# Patient Record
Sex: MALE | Race: BLACK OR AFRICAN AMERICAN | Employment: OTHER | URBAN - METROPOLITAN AREA
[De-identification: names, ages, dates, MRNs, and addresses within clinical notes are randomized per-mention and may not be internally consistent; named-entity substitution may affect disease eponyms.]

---

## 2022-11-17 ENCOUNTER — TRANSCRIBE ORDER (OUTPATIENT)
Dept: SCHEDULING | Age: 77
End: 2022-11-17

## 2022-11-17 ENCOUNTER — OFFICE VISIT (OUTPATIENT)
Dept: NEUROLOGY | Age: 77
End: 2022-11-17
Payer: MEDICARE

## 2022-11-17 DIAGNOSIS — D47.2 MONOCLONAL GAMMOPATHY: Primary | ICD-10-CM

## 2022-11-17 DIAGNOSIS — R20.2 PARESTHESIA: Primary | ICD-10-CM

## 2022-11-17 PROCEDURE — 95911 NRV CNDJ TEST 9-10 STUDIES: CPT | Performed by: PSYCHIATRY & NEUROLOGY

## 2022-11-17 PROCEDURE — 1101F PT FALLS ASSESS-DOCD LE1/YR: CPT | Performed by: PSYCHIATRY & NEUROLOGY

## 2022-11-17 PROCEDURE — G8419 CALC BMI OUT NRM PARAM NOF/U: HCPCS | Performed by: PSYCHIATRY & NEUROLOGY

## 2022-11-17 PROCEDURE — 99213 OFFICE O/P EST LOW 20 MIN: CPT | Performed by: PSYCHIATRY & NEUROLOGY

## 2022-11-17 PROCEDURE — G8536 NO DOC ELDER MAL SCRN: HCPCS | Performed by: PSYCHIATRY & NEUROLOGY

## 2022-11-17 PROCEDURE — 1123F ACP DISCUSS/DSCN MKR DOCD: CPT | Performed by: PSYCHIATRY & NEUROLOGY

## 2022-11-17 PROCEDURE — 95886 MUSC TEST DONE W/N TEST COMP: CPT | Performed by: PSYCHIATRY & NEUROLOGY

## 2022-11-17 PROCEDURE — G8427 DOCREV CUR MEDS BY ELIG CLIN: HCPCS | Performed by: PSYCHIATRY & NEUROLOGY

## 2022-11-17 PROCEDURE — G8432 DEP SCR NOT DOC, RNG: HCPCS | Performed by: PSYCHIATRY & NEUROLOGY

## 2022-11-17 NOTE — PROCEDURES
EMG/ NCS Report  [facility]  - [facility]  [facility]  [facility], [address] [place], [address]   Ph: [phone]   FAX: [phone]/ [phone]  Test Date:  2019      Test Date:  2022    Patient: Sissy Zamarripa : 1945 Physician: Negin Naidu MD   Sex: Male Height: ' \" Ref Phys: Brianna Luis MD   ID#: [id number] Weight:  lbs. Technician: Dafne Westbrook     Patient History / Exam:  Patient is coming for neuropathy evaluation. 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 (+) weakness of  (+) R wrist pain. (+) R medial elbow tenderness    Exam: Patient awake, alert, follows commands, clear speech; hearing grossly intact; EOMI, (-) facial asymmetry, tongue midline; Motor: normal bulk and tone, no tremor              Strength: 5/5 all four extremities  (+) R Tinel  (-) Phalen's  Sensory: reduced PP tips of fingers of R hand  Reflexes: 2+ UE and knees 1+ ankles throughout; Down going toes  Coordination: Good FTN and HTS  Gait: normal gait including tandem             EMG & NCV Findings:  Evaluation of the left median motor nerve showed prolonged distal onset latency (7.7 ms), normal amplitude (5.0 mV), and decreased conduction velocity (Elbow-Wrist, 47 m/s). The right median motor nerve showed prolonged distal onset latency (7.5 ms), reduced amplitude (3.7 mV), and normal conduction velocity (Elbow-Wrist, 63 m/s). The left ulnar motor nerve showed normal distal onset latency (3.1 ms), normal amplitude (8.6 mV), normal conduction velocity (B Elbow-Wrist, 50 m/s), and normal conduction velocity (A Elbow-B Elbow, 50 m/s).   The right ulnar motor nerve showed normal distal onset latency (3.0 ms), normal amplitude (12.6 mV), normal conduction velocity (B Elbow-Wrist, 54 m/s), and decreased conduction velocity (A Elbow-B Elbow, 45 m/s). The left median sensory and the right median sensory nerves showed no response (Wrist). The left radial sensory, the right radial sensory, the left ulnar sensory, and the right ulnar sensory nerves showed normal distal peak latency (L2.5, R2.2, L4.0, R3.5 ms) and normal amplitude (L30.8, R43.1, L15.5, R9.8 µV). Left vs. Right side comparison data for the median motor nerve indicates abnormal L-R velocity difference (Elbow-Wrist, 16 m/s). All remaining left vs. right side differences were within normal limits. All F Wave latencies were within normal limits. All F Wave left vs. right side latency differences were within normal limits. Needle evaluation of the left abductor pollicis brevis and the right abductor pollicis brevis muscles showed diminished recruitment, Incr Duration, and increased motor unit amplitude. All remaining muscles (as indicated in the following table) showed no evidence of electrical instability. Impression:  ABNORMAL    Extensive electrodiagnostic examination of the right and left upper extremities shows the followin) Evidence of bilateral median neuropathies at or distal to the wrist, consistent with a clinical diagnosis of carpal tunnel syndrome, both severe in degree electrically, right worse than left. 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 There is no evidence of a cervical motor radiculopathy.           Adilene Ghosh MD  Diplomate, American Board of Psychiatry and Neurology  Diplomate, Neuromuscular Medicine  Diplomate, American Board of Electrodiagnostic Medicine  Director, [address] [place] [address] Accredited Laboratory with Exemplary Status            Nerve Conduction Studies  Anti Sensory Summary Table     Stim Site NR Peak (ms) Norm Peak (ms) P-T Amp (µV) Norm P-T Amp Site1 Site2 Dist (cm)   Left Median Anti Sensory (2nd Digit)  32.1 °C   Wrist NR  <4  >13 Wrist 2nd Digit 14.0   Right Median Anti Sensory (2nd Digit)  32.5 °C   Wrist NR  <4  >13 Wrist 2nd Digit 14.0   Left Radial Anti Sensory (Base 1st Digit)  32.8 °C   Wrist    2.5 <2.8 30.8 >11 Wrist Base 1st Digit 10.0   Right Radial Anti Sensory (Base 1st Digit)  32.8 °C   Wrist    2.2 <2.8 43.1 >11 Wrist Base 1st Digit 10.0   Left Ulnar Anti Sensory (5th Digit)  32.5 °C   Wrist    4.0 <4.0 15.5 >9 Wrist 5th Digit 14.0   Right Ulnar Anti Sensory (5th Digit)  32.7 °C   Wrist    3.5 <4.0 9.8 >9 Wrist 5th Digit 14.0     Motor Summary Table     Stim Site NR Onset (ms) Norm Onset (ms) O-P Amp (mV) Norm O-P Amp Amp (Prev) (%) Site1 Site2 Dist (cm) Tl (m/s) Norm Tl (m/s)   Left Median Motor (Abd Poll Brev)  32.8 °C   Wrist    7.7 <4.5 5.0 >4.1 100.0 Wrist Abd Poll Brev 8.0     Elbow    13.0  4.4  88.0 Elbow Wrist 25.0 47 >49   Right Median Motor (Abd Poll Brev)  32.9 °C   Wrist    7.5 <4.5 3.7 >4.1 100.0 Wrist Abd Poll Brev 8.0     Elbow    11.3  3.3  89.2 Elbow Wrist 24.0 63 >49   Left Ulnar Motor (Abd Dig Minimi)  32.8 °C   Wrist    3.1 <3.1 8.6 >7.0 100.0 Wrist Abd Dig Minimi 8.0     B Elbow    8.1  7.2  83.7 B Elbow Wrist 25.0 50 >50   A Elbow    10.1  8.2  113.9 A Elbow B Elbow 10.0 50 >50   Right Ulnar Motor (Abd Dig Minimi)  33 °C   Wrist    3.0 <3.1 12.6 >7.0 100.0 Wrist Abd Dig Minimi 8.0     B Elbow    7.6  11.4  90.5 B Elbow Wrist 25.0 54 >50   A Elbow    9.8  10.8  94.7 A Elbow B Elbow 10.0 45 >50     F Wave Studies     NR F-Lat (ms) Lat Norm (ms) L-R F-Lat (ms) L-R Lat Norm   Left Ulnar (Mrkrs) (Abd Dig Min)  32.7 °C      31.70 <32 0.23 <2.5   Right Ulnar (Mrkrs) (Abd Dig Min)  33 °C      31.93 <32 0.23 <2.5     EMG     Side Muscle Nerve Root Ins Act Fibs Psw Recrt Duration Amp Poly Comment   Left 1stDorInt Ulnar C8-T1 Nml Nml Nml Nml Nml Nml Nml    Left ExtIndicis Radial (Post Int) C7-8 Nml Nml Nml Nml Nml Nml Nml    Left Abd Poll Brev Median C8-T1 Nml Nml Nml Reduced Incr Incr Nml    Left Biceps Musculocut C5-6 Nml Nml Nml Nml Nml Nml Nml    Left Triceps Radial C6-7-8 Nml Nml Nml Nml Nml Nml Nml    Left Deltoid Axillary C5-6 Nml Nml Nml Nml Nml Nml Nml    Right 1stDorInt Ulnar C8-T1 Nml Nml Nml Nml Nml Nml Nml    Right ExtIndicis Radial (Post Int) C7-8 Nml Nml Nml Nml Nml Nml Nml    Right Abd Poll Brev Median C8-T1 Nml Nml Nml Reduced Incr Incr Nml    Right Biceps Musculocut C5-6 Nml Nml Nml Nml Nml Nml Nml    Right Triceps Radial C6-7-8 Nml Nml Nml Nml Nml Nml Nml    Right Deltoid Axillary C5-6 Nml Nml Nml Nml Nml Nml Nml    Right Lower Cerv Parasp Rami C7,T1 Nml Nml Nml Nml Nml Nml Nml                Nerve Conduction Studies  Anti Sensory Left/Right Comparison     Stim Site L Lat (ms) R Lat (ms) L-R Lat (ms) L Amp (µV) R Amp (µV) L-R Amp (%) Site1 Site2 L Tl (m/s) R Tl (m/s) L-R Tl (m/s)   Median Anti Sensory (2nd Digit)  32.1 °C   Wrist       Wrist 2nd Digit      Radial Anti Sensory (Base 1st Digit)  32.8 °C   Wrist 1.8 1.6 0.2 30.8 43.1 28.5 Wrist Base 1st Digit 56 63 7   Ulnar Anti Sensory (5th Digit)  32.5 °C   Wrist 3.2 3.0 0.2 15.5 9.8 36.8 Wrist 5th Digit 44 47 3     Motor Left/Right Comparison     Stim Site L Lat (ms) R Lat (ms) L-R Lat (ms) L Amp (mV) R Amp (mV) L-R Amp (%) Site1 Site2 L Tl (m/s) R Tl (m/s) L-R Tl (m/s)   Median Motor (Abd Poll Brev)  32.8 °C   Wrist 7.7 7.5 0.2 5.0 3.7 26.0 Wrist Abd Poll Brev      Elbow 13.0 11.3 1.7 4.4 3.3 25.0 Elbow Wrist 47 63 16   Ulnar Motor (Abd Dig Minimi)  32.8 °C   Wrist 3.1 3.0 0.1 8.6 12.6 31.7 Wrist Abd Dig Minimi      B Elbow 8.1 7.6 0.5 7.2 11.4 36.8 B Elbow Wrist 50 54 4   A Elbow 10.1 9.8 0.3 8.2 10.8 24.1 A Elbow B Elbow 50 45 5         Waveforms:

## 2022-11-17 NOTE — PROGRESS NOTES
EMG/NCS done. See Procedure Note for results. Patient already tried wrist brace with no benefit      Discussed EMG/NCS of both UE:  1) Evidence of bilateral median neuropathies at or distal to the wrist, consistent with a clinical diagnosis of carpal tunnel syndrome, both severe in degree electrically, right worse than left. 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       A> Bilateral CTS, both severe in degree, R worse than L  Superimposed R ulnar neuropathy at the elbow segment, mild in degree    P> 1) SInce patient already failed wrist brace, will refer to Dr Sundra Hammans for bilateral CTS release  2) Advise to keep R elbow straight at night and avoid leaning on the R elbow    Follow up as needed    Mahamed Grant MD

## 2022-11-23 ENCOUNTER — HOSPITAL ENCOUNTER (OUTPATIENT)
Dept: GENERAL RADIOLOGY | Age: 77
Discharge: HOME | End: 2022-11-23
Attending: INTERNAL MEDICINE
Payer: MEDICARE

## 2022-11-23 DIAGNOSIS — D47.2 MONOCLONAL GAMMOPATHY: ICD-10-CM

## 2022-11-23 PROCEDURE — 77075 RADEX OSSEOUS SURVEY COMPL: CPT

## 2022-11-30 ENCOUNTER — OFFICE VISIT (OUTPATIENT)
Dept: ORTHOPEDIC SURGERY | Age: 77
End: 2022-11-30
Payer: MEDICARE

## 2022-11-30 VITALS — HEIGHT: 74 IN | BODY MASS INDEX: 24.77 KG/M2 | WEIGHT: 193 LBS

## 2022-11-30 DIAGNOSIS — G56.03 BILATERAL CARPAL TUNNEL SYNDROME: Primary | ICD-10-CM

## 2022-11-30 DIAGNOSIS — M79.641 BILATERAL HAND PAIN: ICD-10-CM

## 2022-11-30 DIAGNOSIS — G56.21 ULNAR NEUROPATHY AT ELBOW OF RIGHT UPPER EXTREMITY: ICD-10-CM

## 2022-11-30 DIAGNOSIS — M79.642 BILATERAL HAND PAIN: ICD-10-CM

## 2022-11-30 DIAGNOSIS — M65.341 TRIGGER RING FINGER OF RIGHT HAND: ICD-10-CM

## 2022-11-30 NOTE — PROGRESS NOTES
HPI: Ana Rosa Jeffers. (: 1945) is a 76 y.o. male, patient, here for evaluation of the following chief complaint(s):    Hand Pain (Right greater than left hand numbness, tingling for years and gradually worsening. Right hand dominant retired physician.)  Patient is seen today to evaluate his hands. 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 He denies any fall or injury. He states he did build a gazebo around 2017 and noted numbness especially in his hands after awakening from sleep. He has tried to treat conservatively. 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 Lastly he has been complaining of catching triggering locking of his right ring finger. Vitals:  Ht 6' 2\" (1.88 m)   Wt 193 lb (87.5 kg)   BMI 24.78 kg/m²    Body mass index is 24.78 kg/m². No Known Allergies    Current Outpatient Medications   Medication Sig    metoprolol succinate (TOPROL-XL) 25 mg XL tablet     dutasteride (AVODART) 0.5 mg capsule     simvastatin (ZOCOR) 20 mg tablet     tamsulosin (FLOMAX) 0.4 mg capsule     METFORMIN HCL (METFORMIN PO) Take  by mouth two (2) times a day. glipiZIDE (GLUCOTROL) 10 mg tablet Take 10 mg by mouth two (2) times a day. multivitamin (ONE A DAY) tablet Take 1 Tab by mouth daily. SITagliptin (JANUVIA) 50 mg tablet Take 50 mg by mouth daily. No current facility-administered medications for this visit. History reviewed. No pertinent past medical history. History reviewed. No pertinent surgical history. History reviewed. No pertinent family history. Review of Systems   All other systems reviewed and are negative.            Physical Exam    In general the patient demonstrates good elbow, forearm, wrist and digital range of motion. He has Tinel signs positive in both the cubital and carpal tunnels producing paresthesias in the ulnar and median nerve distributions respectively. He has tenderness at the A1 pulley of the right ring finger with clicking and locking. He really had no complaints of significance of the left side though clinically it appears that he has a mildly positive Tinel's of the left carpal tunnel and seems to have intact thenar strength bilaterally    Imaging:    XR Results (most recent):  Results from Hospital Encounter encounter on 11/23/22    XR BONE SURVEY COMP    Narrative  Clinical indication: Monoclonal gammopathy. A complete bone survey is performed. No prior. Bone appears normal in mineral content. Prominent spondylosis seen throughout the spine. No Fracture or dislocation. No focal lytic or blastic lesions. The pedicles are visualized. Impression  1. No acute fracture or dislocation. 2. Diffuse mild DJD. 3. No focal lytic or blastic lesions. ASSESSMENT/PLAN:  Below is the assessment and plan developed based on review of pertinent history, physical exam, labs, studies, and medications. Patient examination was consistent with bilateral right greater than left wrist carpal tunnel syndrome with right elbow ulnar nerve entrapment and right ring finger stenosing tenosynovitis with locking. He has been increasingly symptomatic for 7 to 8 years. I reviewed treatment options and answered his questions. He feels he has exhausted his conservative options and elects to pursue operative treatment. The current plan is to perform a right open carpal tunnel release right elbow ulnar nerve decompression of the right ring trigger finger. In the future he could then consider at least an left carpal tunnel release.   I reviewed risks that include but not limited to stiffness, pain, nerve or tendon damage and overall incomplete relief of pain and paresthesias. Arrangements may be made for this to be performed in outpatient basis at his convenience although he is hoping to recover well enough to travel to [place] in February. 1. Bilateral carpal tunnel syndrome  2. Bilateral hand pain  3. Ulnar neuropathy at elbow of right upper extremity  4. Trigger ring finger of right hand      Return for Follow-up 7 to 10 days after surgery. .    An electronic signature was used to authenticate this note.   -- Conrad Mosqueda MD

## 2022-11-30 NOTE — LETTER
11/30/2022    Patient: Olga Sosa. YOB: 1945   Date of Visit: 11/30/2022     Brenda Mcneal MD  [address] [address]  [address] Naomie Rangel MD  [address]  [address] [facility] [address]    Dear MD Madeline Kim MD,      Thank you for referring Mr. Tiesha Prado to [facility] for evaluation. My notes for this consultation are attached. If you have questions, please do not hesitate to call me. I look forward to following your patient along with you.       Sincerely,    Kat Anthony MD

## 2022-11-30 NOTE — PATIENT INSTRUCTIONS
Carpal Tunnel Syndrome: Care Instructions  Overview     Carpal tunnel syndrome is numbness, tingling, weakness, and pain in your hand, wrist, and sometimes forearm. It is caused by pressure on the median nerve. This nerve and several tough tissues called tendons run through a space in the wrist. This space is called the carpal tunnel. The repeated hand motions used in work and some hobbies and sports can put pressure on the median nerve. Pregnancy can cause carpal tunnel syndrome. Several conditions, such as diabetes, arthritis, and an underactive thyroid, can also cause it. You may be able to limit an activity or change the way you do it to reduce your symptoms. You also can take other steps to feel better. If your symptoms are mild, 1 to 2 weeks of home treatment are likely to ease your pain. Surgery is needed only if other treatments do not work. Follow-up care is a key part of your treatment and safety. Be sure to make and go to all appointments, and call your doctor if you are having problems. It's also a good idea to know your test results and keep a list of the medicines you take. How can you care for yourself at home? If possible, stop or reduce the activity that causes your symptoms. If you cannot stop the activity, take frequent breaks to rest and stretch or change hand positions to do a task. Try switching hands, such as when using a computer mouse. Try to avoid bending or twisting your wrists. Ask your doctor if you can take an over-the-counter pain medicine, such as acetaminophen (Tylenol), ibuprofen (Advil, Motrin), or naproxen (Aleve). Be safe with medicines. Read and follow all instructions on the label. If your doctor prescribes corticosteroid medicine to help reduce pain and swelling, take it exactly as prescribed. Call your doctor if you think you are having a problem with your medicine. Put ice or a cold pack on your wrist for 10 to 20 minutes at a time to ease pain.  Put a thin cloth between the ice and your skin. If your doctor or your physical or occupational therapist tells you to wear a wrist splint, wear it as directed to keep your wrist in a neutral position. This also eases pressure on your median nerve. Ask your doctor whether you should have physical or occupational therapy to learn how to do tasks differently. Try a yoga class to stretch your muscles and build strength in your hands and wrists. Yoga has been shown to ease carpal tunnel symptoms. To prevent carpal tunnel  When working at a computer, keep your hands and wrists in line with your forearms. Hold your elbows close to your sides. Take a break every 10 to 15 minutes. Try these exercises:  Warm up: Rotate your wrist up, down, and from side to side. Repeat this 4 times. Stretch your fingers far apart, relax them, then stretch them again. Repeat 4 times. Stretch your thumb by pulling it back gently, holding it, and then releasing it. Repeat 4 times. Prayer stretch: Start with your palms together in front of your chest just below your chin. Slowly lower your hands toward your waistline while keeping your hands close to your stomach and your palms together until you feel a mild to moderate stretch under your forearms. Hold for 10 to 20 seconds. Repeat 4 times. Wrist flexor stretch: Hold your arm in front of you with your palm up. Bend your wrist, pointing your hand toward the floor. With your other hand, gently bend your wrist further until you feel a mild to moderate stretch in your forearm. Hold for 10 to 20 seconds. Repeat 4 times. Wrist extensor stretch: Repeat the steps for the wrist flexor stretch, but begin with your extended hand palm down. Squeeze a rubber exercise ball several times a day to keep your hands and fingers strong. Avoid holding objects (such as a book) in one position for a long time. When possible, use your whole hand to grasp an object.  Using just the thumb and index finger can put stress on the wrist.  Do not smoke. It can make this condition worse by reducing blood flow to the median nerve. If you need help quitting, talk to your doctor about stop-smoking programs and medicines. These can increase your chances of quitting for good. When should you call for help? Watch closely for changes in your health, and be sure to contact your doctor if:    Your pain or other problems do not get better with home care. You want more information about physical or occupational therapy. You have side effects of your corticosteroid medicine, such as:  Weight gain. Mood changes. Trouble sleeping. Bruising easily. You have any other problems with your medicine. Where can you learn more? Go to http://www.gray.com/  Enter R432 in the search box to learn more about \"Carpal Tunnel Syndrome: Care Instructions. \"  Current as of: March 9, 2022               Content Version: 13.4  © 8094-0657 [employer]. Care instructions adapted under license by [employer] (which disclaims liability or warranty for this information). If you have questions about a medical condition or this instruction, always ask your healthcare professional. [place] [address] any warranty or liability for your use of this information. Trigger Finger: Care Instructions  Overview  A trigger finger is a finger stuck in a bent position. It happens when the tendon that bends and straightens the thumb or finger can't slide smoothly under the ligaments that hold the tendon against the bones. In most cases, it's caused by a bump (nodule) that forms on the tendon. The bent finger usually straightens out on its own. A trigger finger can be painful. But it normally isn't a serious problem. Trigger fingers seem to occur more in some groups of people. These groups include:  People who have diabetes or arthritis.   People who have injured their hands in the past.  Musicians. People who  tools often. Rest and exercises may help your finger relax so that it can bend. You may get a corticosteroid shot. This can reduce swelling and pain. Your doctor may put a splint on your finger. It will give your finger some rest. You may need surgery if the finger keeps locking in a bent position. Follow-up care is a key part of your treatment and safety. Be sure to make and go to all appointments, and call your doctor if you are having problems. It's also a good idea to know your test results and keep a list of the medicines you take. How can you care for yourself at home? If your doctor put a splint on your finger, wear it as directed. Don't take it off until your doctor says you can. You may need to change your activities to avoid movements that irritate the finger. Take your medicines exactly as prescribed. Call your doctor if you think you are having a problem with your medicine. Ask your doctor if you can take an over-the-counter pain medicine, such as acetaminophen (Tylenol), ibuprofen (Advil, Motrin), or naproxen (Aleve). Be safe with medicines. Read and follow all instructions on the label. If your doctor recommends exercises, do them as directed. When should you call for help? Call your doctor now or seek immediate medical care if:    Your finger locks in a bent position and will not straighten. Watch closely for changes in your health, and be sure to contact your doctor if:    You do not get better as expected. Where can you learn more? Go to http://www.Acomni.com/  Enter M826 in the search box to learn more about \"Trigger Finger: Care Instructions. \"  Current as of: March 9, 2022               Content Version: 13.4  © 5081-3633 [employer]. Care instructions adapted under license by [employer] (which disclaims liability or warranty for this information).  If you have questions about a medical condition or this instruction, always ask your healthcare professional. [place] [address] any warranty or liability for your use of this information.

## 2022-12-01 DIAGNOSIS — G56.03 BILATERAL CARPAL TUNNEL SYNDROME: ICD-10-CM

## 2022-12-01 DIAGNOSIS — G56.21 ULNAR NEUROPATHY AT ELBOW OF RIGHT UPPER EXTREMITY: ICD-10-CM

## 2022-12-01 DIAGNOSIS — M65.341 TRIGGER RING FINGER OF RIGHT HAND: Primary | ICD-10-CM

## 2022-12-08 NOTE — PERIOP NOTES
[facility]  Ambulatory Surgery Unit  Pre-operative Instructions    Surgery/Procedure Date  Wednesday 12/14/2022            Tentative Arrival Time TBD      1. On the day of your surgery/procedure, please report to the Ambulatory Surgery Unit Registration Desk and sign in at your designated time. The Ambulatory Surgery Unit is located in [facility] on the [facility] side of the [facility] across from the [address] [place] [address]. Please have all of your health insurance cards, co-payment, and a photo ID.    **TWO adults may accompany you the day of the procedure. We have limited seating available. If our waiting room is at capacity, your ride may be asked to remain in their vehicle. No one under 15 is allowed in the waiting room. 2. You must have someone with you to drive you home, as you should not drive a car for 24 hours following anesthesia. Please make arrangements for a responsible adult friend or family member to stay with you for at least the first 24 hours after your surgery. 3. Do not have anything to eat or drink (including water, gum, mints, coffee, juice) after 11:59 PM on Tuesday 12/13 . This may not apply to medications prescribed by your physician. (Please note below the special instructions with medications to take the morning of surgery, if applicable.)    4. We recommend you do not drink any alcoholic beverages for 24 hours before and after your surgery. 5. Contact your surgeons office for instructions on the following medications: non-steroidal anti-inflammatory drugs (i.e. Advil, Aleve), vitamins, and supplements. (Some surgeons will want you to stop these medications prior to surgery and others may allow you to take them)   **If you are currently taking Plavix, Coumadin, Aspirin and/or other blood-thinning agents, contact your surgeon for instructions. ** Your surgeon will partner with the physician prescribing these medications to determine if it is safe to stop or if you need to continue taking. Please do not stop taking these medications without instructions from your surgeon. 6. In an effort to help prevent surgical site infection, we ask that you shower with an anti-bacterial soap (i.e. Dial/Safeguard, or the soap provided to you at your preadmission testing appointment) for 3 days prior to and on the morning of surgery, using a fresh towel after each shower. (Please begin this process with fresh bed linens.) Do not apply any lotions, powders, or deodorants after the shower on the day of your procedure. If applicable, please do not shave the operative site for 48 hours prior to surgery. 7. Wear comfortable clothes. Wear glasses instead of contacts. Do not bring any jewelry or money (other than copays or fees as instructed). Do not wear make-up, particularly mascara, the morning of your surgery. Do not wear nail polish, particularly if you are having foot /hand surgery. Wear your hair loose or down, no ponytails, buns, luciana pins or clips. All body piercings must be removed. 8. You should understand that if you do not follow these instructions your surgery may be cancelled. If your physical condition changes (i.e. fever, cold or flu) please contact your surgeon as soon as possible. 9. It is important that you be on time. If a situation occurs where you may be late, or if you have any questions or problems, please call [phone].    10. Your surgery time may be subject to change. You will receive a phone call the day prior to surgery to confirm your arrival time. 11. Pediatric patients: please bring a change of clothes, diapers, bottle/sippy cup, pacifier, etc.      Special Instructions: Take all medications and inhalers, as prescribed, on the morning of surgery with a sip of water EXCEPT: diabetic medications      Insulin Dependent Diabetic patients: Take your diabetic medications as prescribed the day before surgery.   Hold all diabetic medications the day of surgery. If you are scheduled to arrive for surgery after 8:00 AM, and your AM blood sugar is >200, please call Ambulatory Surgery. I understand a pre-operative phone call will be made to verify my surgery time. In the event that I am not available, I give permission for a message to be left on my answering service and/or with another person?       Yes    Reviewed instructions via telephone, patient verbalized understanding         ___________________      ___________________      ________________  (Signature of Patient)          (Witness)                   (Date and Time)

## 2022-12-13 ENCOUNTER — ANESTHESIA EVENT (OUTPATIENT)
Dept: SURGERY | Age: 77
End: 2022-12-13
Payer: MEDICARE

## 2022-12-13 DIAGNOSIS — G56.21 ULNAR NEUROPATHY AT ELBOW OF RIGHT UPPER EXTREMITY: ICD-10-CM

## 2022-12-13 DIAGNOSIS — M65.341 TRIGGER RING FINGER OF RIGHT HAND: ICD-10-CM

## 2022-12-13 DIAGNOSIS — G56.03 BILATERAL CARPAL TUNNEL SYNDROME: Primary | ICD-10-CM

## 2022-12-13 RX ORDER — HYDROCODONE BITARTRATE AND ACETAMINOPHEN 5; 325 MG/1; MG/1
1 TABLET ORAL
Qty: 15 TABLET | Refills: 0 | Status: SHIPPED | OUTPATIENT
Start: 2022-12-13 | End: 2022-12-18

## 2022-12-14 ENCOUNTER — ANESTHESIA (OUTPATIENT)
Dept: SURGERY | Age: 77
End: 2022-12-14
Payer: MEDICARE

## 2022-12-14 ENCOUNTER — HOSPITAL ENCOUNTER (OUTPATIENT)
Age: 77
Setting detail: OUTPATIENT SURGERY
Discharge: HOME | End: 2022-12-14
Attending: ORTHOPAEDIC SURGERY | Admitting: ORTHOPAEDIC SURGERY
Payer: MEDICARE

## 2022-12-14 VITALS
DIASTOLIC BLOOD PRESSURE: 71 MMHG | HEART RATE: 48 BPM | WEIGHT: 199 LBS | RESPIRATION RATE: 14 BRPM | SYSTOLIC BLOOD PRESSURE: 142 MMHG | BODY MASS INDEX: 25.54 KG/M2 | TEMPERATURE: 97.6 F | HEIGHT: 74 IN | OXYGEN SATURATION: 99 %

## 2022-12-14 DIAGNOSIS — G56.01 RIGHT CARPAL TUNNEL SYNDROME: Primary | ICD-10-CM

## 2022-12-14 DIAGNOSIS — M65.341 TRIGGER FINGER, RIGHT RING FINGER: ICD-10-CM

## 2022-12-14 DIAGNOSIS — G56.21 ENTRAPMENT OF RIGHT ULNAR NERVE AT ELBOW: ICD-10-CM

## 2022-12-14 DIAGNOSIS — M65.331 TRIGGER FINGER, RIGHT MIDDLE FINGER: ICD-10-CM

## 2022-12-14 LAB
GLUCOSE BLD STRIP.AUTO-MCNC: 161 MG/DL (ref 65–117)
SERVICE CMNT-IMP: ABNORMAL

## 2022-12-14 PROCEDURE — 77030031139 HC SUT VCRL2 J&J -A: Performed by: ORTHOPAEDIC SURGERY

## 2022-12-14 PROCEDURE — 76030000000 HC AMB SURG OR TIME 0.5 TO 1: Performed by: ORTHOPAEDIC SURGERY

## 2022-12-14 PROCEDURE — 76060000061 HC AMB SURG ANES 0.5 TO 1 HR: Performed by: ORTHOPAEDIC SURGERY

## 2022-12-14 PROCEDURE — 64718 REVISE ULNAR NERVE AT ELBOW: CPT | Performed by: ORTHOPAEDIC SURGERY

## 2022-12-14 PROCEDURE — 77030002916 HC SUT ETHLN J&J -A: Performed by: ORTHOPAEDIC SURGERY

## 2022-12-14 PROCEDURE — 74011250636 HC RX REV CODE- 250/636: Performed by: ANESTHESIOLOGY

## 2022-12-14 PROCEDURE — 77030040356 HC CORD BPLR FRCP COVD -A: Performed by: ORTHOPAEDIC SURGERY

## 2022-12-14 PROCEDURE — 2709999900 HC NON-CHARGEABLE SUPPLY: Performed by: ORTHOPAEDIC SURGERY

## 2022-12-14 PROCEDURE — 77030039497 HC CST PAD STERILE CHCS -A: Performed by: ORTHOPAEDIC SURGERY

## 2022-12-14 PROCEDURE — 82962 GLUCOSE BLOOD TEST: CPT

## 2022-12-14 PROCEDURE — 74011000250 HC RX REV CODE- 250: Performed by: ORTHOPAEDIC SURGERY

## 2022-12-14 PROCEDURE — 26055 INCISE FINGER TENDON SHEATH: CPT | Performed by: ORTHOPAEDIC SURGERY

## 2022-12-14 PROCEDURE — 74011250636 HC RX REV CODE- 250/636: Performed by: NURSE ANESTHETIST, CERTIFIED REGISTERED

## 2022-12-14 PROCEDURE — 76210000040 HC AMBSU PH I REC FIRST 0.5 HR: Performed by: ORTHOPAEDIC SURGERY

## 2022-12-14 PROCEDURE — 76210000050 HC AMBSU PH II REC 0.5 TO 1 HR: Performed by: ORTHOPAEDIC SURGERY

## 2022-12-14 PROCEDURE — 74011250636 HC RX REV CODE- 250/636: Performed by: ORTHOPAEDIC SURGERY

## 2022-12-14 PROCEDURE — 77030000032 HC CUF TRNQT ZIMM -B: Performed by: ORTHOPAEDIC SURGERY

## 2022-12-14 PROCEDURE — 77030002933 HC SUT MCRYL J&J -A: Performed by: ORTHOPAEDIC SURGERY

## 2022-12-14 PROCEDURE — 64721 CARPAL TUNNEL SURGERY: CPT | Performed by: ORTHOPAEDIC SURGERY

## 2022-12-14 PROCEDURE — 77030006689 HC BLD OPHTH BVR BD -A: Performed by: ORTHOPAEDIC SURGERY

## 2022-12-14 RX ORDER — SODIUM CHLORIDE 0.9 % (FLUSH) 0.9 %
5-40 SYRINGE (ML) INJECTION EVERY 8 HOURS
Status: DISCONTINUED | OUTPATIENT
Start: 2022-12-14 | End: 2022-12-14 | Stop reason: HOSPADM

## 2022-12-14 RX ORDER — FENTANYL CITRATE 50 UG/ML
25 INJECTION, SOLUTION INTRAMUSCULAR; INTRAVENOUS
Status: DISCONTINUED | OUTPATIENT
Start: 2022-12-14 | End: 2022-12-14 | Stop reason: HOSPADM

## 2022-12-14 RX ORDER — HYDROMORPHONE HYDROCHLORIDE 1 MG/ML
.2-.5 INJECTION, SOLUTION INTRAMUSCULAR; INTRAVENOUS; SUBCUTANEOUS ONCE
Status: DISCONTINUED | OUTPATIENT
Start: 2022-12-14 | End: 2022-12-14 | Stop reason: HOSPADM

## 2022-12-14 RX ORDER — MIDAZOLAM HYDROCHLORIDE 1 MG/ML
INJECTION, SOLUTION INTRAMUSCULAR; INTRAVENOUS
Status: COMPLETED
Start: 2022-12-14 | End: 2022-12-14

## 2022-12-14 RX ORDER — SODIUM CHLORIDE, SODIUM LACTATE, POTASSIUM CHLORIDE, CALCIUM CHLORIDE 600; 310; 30; 20 MG/100ML; MG/100ML; MG/100ML; MG/100ML
25 INJECTION, SOLUTION INTRAVENOUS CONTINUOUS
Status: DISCONTINUED | OUTPATIENT
Start: 2022-12-14 | End: 2022-12-14 | Stop reason: HOSPADM

## 2022-12-14 RX ORDER — ONDANSETRON 2 MG/ML
4 INJECTION INTRAMUSCULAR; INTRAVENOUS AS NEEDED
Status: DISCONTINUED | OUTPATIENT
Start: 2022-12-14 | End: 2022-12-14 | Stop reason: HOSPADM

## 2022-12-14 RX ORDER — DIPHENHYDRAMINE HYDROCHLORIDE 50 MG/ML
12.5 INJECTION, SOLUTION INTRAMUSCULAR; INTRAVENOUS AS NEEDED
Status: DISCONTINUED | OUTPATIENT
Start: 2022-12-14 | End: 2022-12-14 | Stop reason: HOSPADM

## 2022-12-14 RX ORDER — LIDOCAINE HYDROCHLORIDE 10 MG/ML
0.1 INJECTION, SOLUTION EPIDURAL; INFILTRATION; INTRACAUDAL; PERINEURAL AS NEEDED
Status: DISCONTINUED | OUTPATIENT
Start: 2022-12-14 | End: 2022-12-14 | Stop reason: HOSPADM

## 2022-12-14 RX ORDER — MORPHINE SULFATE 10 MG/ML
2 INJECTION, SOLUTION INTRAMUSCULAR; INTRAVENOUS
Status: DISCONTINUED | OUTPATIENT
Start: 2022-12-14 | End: 2022-12-14 | Stop reason: HOSPADM

## 2022-12-14 RX ORDER — PROPOFOL 10 MG/ML
INJECTION, EMULSION INTRAVENOUS
Status: DISCONTINUED | OUTPATIENT
Start: 2022-12-14 | End: 2022-12-14 | Stop reason: HOSPADM

## 2022-12-14 RX ORDER — OXYCODONE AND ACETAMINOPHEN 5; 325 MG/1; MG/1
1 TABLET ORAL
Status: DISCONTINUED | OUTPATIENT
Start: 2022-12-14 | End: 2022-12-14 | Stop reason: HOSPADM

## 2022-12-14 RX ORDER — SODIUM CHLORIDE 0.9 % (FLUSH) 0.9 %
5-40 SYRINGE (ML) INJECTION AS NEEDED
Status: DISCONTINUED | OUTPATIENT
Start: 2022-12-14 | End: 2022-12-14 | Stop reason: HOSPADM

## 2022-12-14 RX ORDER — MIDAZOLAM HYDROCHLORIDE 1 MG/ML
INJECTION, SOLUTION INTRAMUSCULAR; INTRAVENOUS
Status: COMPLETED | OUTPATIENT
Start: 2022-12-14 | End: 2022-12-14

## 2022-12-14 RX ORDER — ROPIVACAINE HYDROCHLORIDE 5 MG/ML
INJECTION, SOLUTION EPIDURAL; INFILTRATION; PERINEURAL
Status: DISCONTINUED
Start: 2022-12-14 | End: 2022-12-14 | Stop reason: WASHOUT

## 2022-12-14 RX ADMIN — WATER 2 G: 1 INJECTION INTRAMUSCULAR; INTRAVENOUS; SUBCUTANEOUS at 12:09

## 2022-12-14 RX ADMIN — PROPOFOL 50 MCG/KG/MIN: 10 INJECTION, EMULSION INTRAVENOUS at 12:04

## 2022-12-14 RX ADMIN — SODIUM CHLORIDE, POTASSIUM CHLORIDE, SODIUM LACTATE AND CALCIUM CHLORIDE: 600; 310; 30; 20 INJECTION, SOLUTION INTRAVENOUS at 11:59

## 2022-12-14 RX ADMIN — MIDAZOLAM HYDROCHLORIDE 1 MG: 1 INJECTION, SOLUTION INTRAMUSCULAR; INTRAVENOUS at 11:40

## 2022-12-14 RX ADMIN — MEPIVACAINE HYDROCHLORIDE 30 ML: 15 INJECTION, SOLUTION EPIDURAL; INFILTRATION at 11:43

## 2022-12-14 NOTE — DISCHARGE INSTRUCTIONS
Dr. Teofilo Nichols Instructions for Elbow/Wrist/Hand Surgery    Medications/Diet  Eat only light, non-greasy foods today  Take pain medicine with food  While taking pain medicines, you may not operate a vehicle, heavy machinery, or appliances  While taking pain medicines, you may not drink alcoholic beverages  While taking pain medicines, you may not make critical decisions or sign legal papers  If you have any reactions to your medicines, stop taking them and call my office immediately  Please keep in mind that constipation is a very common side   effect of taking narcotic pain medication. We recommend that patients take precautions to prevent constipation:  Drink plenty of water (6-8 glasses of 8 oz. a day)  Avoid alcohol, caffeine, and dairy products  Eat plenty of fiber (fruits, vegetables and whole grains)  Take an over the counter stool softener (colace or dulcolax)  Patients that have had upper extremity surgery should take frequent walks      Activity/Exercise    Exercises are not necessary at this stage, and you will be given exercises at your first post operative visit  You are in a splint and should remain in this until your first postoperative visit  Please keep ice applied to the wrist for the first 72 hours or as long as pain or swelling persist. Do not apply ice directly to skin, or allow water to leak on your dressing    Dressings/Shower    Please keep dressing dry  If you have had arthroscopy, you may expect some drainage  Please reinforce your dressing with a dry sterile dressing  Your dressing will be removed at your first post operative visit  You may not shower until after your first post operative visit    Emergency/Follow-Up    Please notify my office at [phone] if you develop any fever (101° or above), unexpected warmth, redness or swelling in your hand.  Please call if your fingers become cold, purple, numb, or there is excessive bleeding  Please call the office within 24 hours at [phone] ([phone]) to schedule a follow up appointment next week  Please call the office before 3pm on Friday if you do not have enough pain medicine for the weekend. Narcotic pain medication cannot be called into your pharmacy and the prescription must be picked up at our office. TO PREVENT AN INFECTION      [address] YOUR HANDS    To prevent infection, good handwashing is the most important thing you or your caregiver can do. Wash your hands with soap and water or use the hand  we gave you before you touch any wounds. SHOWER    Use the antibacterial soap we gave you when you take a shower. Shower with this soap until your wounds are healed. To reach all areas of your body, you may need someone to help you. Dont forget to clean your belly button with every shower. USE CLEAN SHEETS    Use freshly cleaned sheets on your bed after surgery. To keep the surgery site clean, do not allow pets to sleep with you while your wound is still healing. STOP SMOKING    Stop smoking, or at least cut back on smoking    Smoking slows your healing. CONTROL YOUR BLOOD SUGAR    High blood sugars slow wound healing. If you are diabetic, control your blood sugar levels before and after your surgery. MAY TAKE 500MG OF TYLENOL WHEN TAKING THE NARCOTIC. TAKE NARCOTIC PAIN MEDICATIONS WITH FOOD! For the night of surgery, while block is still in effect, start with 1 pain pill at bedtime    Narcotics tend to be constipating and we recommend taking a stool softener such as Colace or Miralax (follow package instructions). If you were given prescriptions, please review the written information on the prescribed medications. DO NOT DRIVE WHILE TAKING NARCOTIC PAIN MEDICATIONS. CPAP PATIENTS BE SURE TO WEAR MACHINE WHENEVER NAPPING OR SLEEPING DAY/NIGHT OF SURGERY!     DISCHARGE SUMMARY from Nurse    The following personal items collected during your admission are returned to you:   Dental Appliance: Dental Appliances: None  Vision: Visual Aid: Glasses, With patient  Hearing Aid:    Jewelry: Jewelry: None  Clothing: Clothing: With patient  Other Valuables: Other Valuables: Eyeglasses, With patient  Valuables sent to safe:        PATIENT INSTRUCTIONS:    After General Anesthesia or Intravenous Sedation, for 24 hours or while taking prescription Narcotics:  Someone should be with you for the next 24 hours. For your own safety, a responsible adult must drive you home. Limit your activities  Recommended activity: Rest today, up with assistance today. Do not climb stairs or shower unattended for the next 24 hours. Start with a soft bland diet and advance as tolerated (no nausea) to regular diet. If you have a sore throat some things that may help are: fluids, warm salt water gargle, or throat lozenges. If this does not improve after several days please follow up with your family physician. Do not drive and operate hazardous machinery  Do not make important personal or business decisions  Do  not drink alcoholic beverages  If you have not urinated within 8 hours after discharge, please contact your surgeon on call. Report the following to your surgeon:  Excessive pain, swelling, redness or odor of or around the surgical area  Temperature over 100.5  Nausea and vomiting lasting longer than 4 hours or if unable to take medications  Any signs of decreased circulation or nerve impairment to extremity: change in color, persistent  numbness, tingling, coldness or increase pain    If you received an upper extremity nerve block, please wear your sling until the block has worn off, then refer to your surgeons post-operative instructions. If you have had a shoulder block or a block near your collar bone, you may have              symptoms such as:          1. Mild shortness of breath        2. A hoarse voice        3. Blurry vision        4. Unequal pupils        5.  Drooping of your face on the same side as the nerve block. These symptoms will disappear as the nerve block wears off. You will receive a Post Operative Call from one of the Recovery Room Nurses on the day after your surgery to check on you. It is very important for us to know how you are recovering after your surgery. If you have an issue please call your surgeon, do not wait for the post operative call. You may receive an e-mail or letter in the mail from [employer] regarding your experience with us in the Ambulatory Surgery Unit. Your feedback is valuable to us and we appreciate your participation in the survey. If the above instructions are not adequate or you are having problems after your surgery, call your physician at their office number. We wish youre a speedy recovery ? What to do at Home:    *  Please give a list of your current medications to your Primary Care Provider. *  Please update this list whenever your medications are discontinued, doses are      changed, or new medications (including over-the-counter products) are added. *  Please carry medication information at all times in case of emergency situations. If you have not had your influenza or pneumococcal vaccines, please follow up with your primary care physician. The discharge information has been reviewed with the patient and caregiver. The patient and caregiver verbalized understanding.

## 2022-12-14 NOTE — BRIEF OP NOTE
Brief Postoperative Note    Patient: Ana Rosa Jeffers. YOB: 1945  MRN: [id number]    Date of Procedure: 12/14/2022     Pre-Op Diagnosis: CARPAL TUNNEL SYNROME, ULNAR NEUROPATHY TRIGGER FINGER    Post-Op Diagnosis: Same as preoperative diagnosis. Procedure(s):  RIGHT OPEN CARPAL TUNNEL RELEASE, RIGHT ELBOW ULNAR NERVE DECOMPRESSION, RIGHT RING TRIGGER FINGER RELEASE, & RIGHT MIDDLE FINGER TRIGGER FINGER RELEASE (AXILLARY, BLOCK)  .     Surgeon(s):  Taras Lew MD    Surgical Assistant: None    Anesthesia: MAC     Estimated Blood Loss (mL): Minimal    Complications: None    Specimens: * No specimens in log *     Implants: * No implants in log *    Drains: * No LDAs found *    Findings: Above    Electronically Signed by Ana Polo MD on 12/14/2022 at 12:51 PM

## 2022-12-14 NOTE — ANESTHESIA POSTPROCEDURE EVALUATION
Procedure(s):  RIGHT OPEN CARPAL TUNNEL RELEASE, RIGHT ELBOW ULNAR NERVE DECOMPRESSION, RIGHT RING TRIGGER FINGER RELEASE, & RIGHT MIDDLE FINGER TRIGGER FINGER RELEASE (AXILLARY, BLOCK)  . Dora Bloodgood MAC, regional    Anesthesia Post Evaluation      Multimodal analgesia: multimodal analgesia used between 6 hours prior to anesthesia start to PACU discharge  Patient location during evaluation: PACU  Patient participation: complete - patient participated  Level of consciousness: awake and alert  Pain score: 0  Airway patency: patent  Anesthetic complications: no  Cardiovascular status: acceptable and bradycardic  Respiratory status: acceptable  Hydration status: acceptable  Comments: Pt has Supraclavicular block; sling postop until block resolves. Post anesthesia nausea and vomiting:  none  Final Post Anesthesia Temperature Assessment:  Normothermia (36.0-37.5 degrees C)      INITIAL Post-op Vital signs:   Vitals Value Taken Time   /53 12/14/22 1306   Temp 36.3 °C (97.4 °F) 12/14/22 1254   Pulse 50 12/14/22 1311   Resp 12 12/14/22 1311   SpO2 100 % 12/14/22 1311   Vitals shown include unvalidated device data.

## 2022-12-14 NOTE — PERIOP NOTES
Dr. Edmund Claire performed a RUE block with ultrasound. Patient on continuous cardiac and pulse oximetry monitoring throughout the procedure, nasal cannula 3 liters. Patient received 1 mg, Versed, IV, administered by Dr. Edmund Claire. Vital signs stable. Patient tolerated procedure well. Patient drowsy but arouses when spoken to. Will continue to monitor.

## 2022-12-14 NOTE — PERIOP NOTES
Patient: Yarelis Bhatti. MRN: [id number]  SSN: [id number]   YOB: 1945  Age: 76 y.o. Sex: male     Patient is status post Procedure(s):  RIGHT OPEN CARPAL TUNNEL RELEASE, RIGHT ELBOW ULNAR NERVE DECOMPRESSION, RIGHT RING TRIGGER FINGER RELEASE, & RIGHT MIDDLE FINGER TRIGGER FINGER RELEASE (AXILLARY, BLOCK)  . Andres Lala Surgeon(s) and Role:     Tanja Gallagher MD - Primary      Dressing/Packing:  Incision 12/14/22 Arm Right-Dressing/Treatment: Cast padding;Gauze dressing/dressing sponge;Steri-strips;Xeroform; Other (Comment);ABD pad; Ace wrap (MASTISOL) (12/14/22 1100)

## 2022-12-14 NOTE — PERIOP NOTES
Permission received to review discharge instructions and discuss private health information with wifeScotty. Patient states that family/friend will be with them for at least 24 hours following today's procedure. Mistral-Air warming blanket applied at this time. Set to appropriate setting that is comfortable to patient. Will continue to monitor.

## 2022-12-14 NOTE — PERIOP NOTES
Dr. Lance Bateman at bedside speaking to pt. Pt is A&O x3. Pt and Dr. Lance Bateman both agree to add to consent, right middle finger, trigger finger release. Spoke to Mrs. Birminghama Stallings in waiting room about the addition to the surgery consent. She voiced understanding and signed consent since pt had received Versed.

## 2022-12-14 NOTE — ANESTHESIA PREPROCEDURE EVALUATION
Relevant Problems   No relevant active problems       Anesthetic History   No history of anesthetic complications            Review of Systems / Medical History  Patient summary reviewed, nursing notes reviewed and pertinent labs reviewed    Pulmonary  Within defined limits                 Neuro/Psych   Within defined limits           Cardiovascular                  Exercise tolerance: >4 METS  Comments: Irregular hear beat    05/15 ECHO= EF 65%, mild LVH    2015 Holter: SR, PVC, PACs, 4 runs SVT    Normal Cath   GI/Hepatic/Renal         Renal disease (Stage III): CRI       Endo/Other    Diabetes: type 2         Other Findings   Comments: Right Carpal tunnel Syndrome  Ulnar neuropathy   Trigger finger           Physical Exam    Airway  Mallampati: I  TM Distance: > 6 cm  Neck ROM: normal range of motion   Mouth opening: Normal     Cardiovascular    Rhythm: regular  Rate: normal         Dental    Dentition: Bridges  Comments: Upper left   Pulmonary  Breath sounds clear to auscultation               Abdominal  GI exam deferred       Other Findings            Anesthetic Plan    ASA: 2  Anesthesia type: MAC and regional - supraclavicular block      Post-op pain plan if not by surgeon: peripheral nerve block single    Induction: Intravenous  Anesthetic plan and risks discussed with: Patient      Took BB at 7 am

## 2022-12-14 NOTE — H&P
Date of Surgery Update:  Antoni Chicas was seen and examined. History and physical has been reviewed. The patient has been examined. There have been no significant clinical changes since the completion of the originally dated History and Physical.  Patient identified by surgeon; surgical site was confirmed by patient and surgeon.     Signed By: Johanna Acosta MD     December 14, 2022 11:57 AM

## 2022-12-14 NOTE — PERIOP NOTES
Catalino Situ.  1945  [id number]    Situation:  Verbal report given from: Ajay Broderick CRNA  Procedure: Procedure(s):  RIGHT OPEN CARPAL TUNNEL RELEASE, RIGHT ELBOW ULNAR NERVE DECOMPRESSION, RIGHT RING TRIGGER FINGER RELEASE, & RIGHT MIDDLE FINGER TRIGGER FINGER RELEASE (AXILLARY, BLOCK)  .     Background:    Preoperative diagnosis: CARPAL TUNNEL SYNROME, ULNAR NEUROPATHY TRIGGER FINGER    Postoperative diagnosis: CARPAL TUNNEL SYNROME, ULNAR NEUROPATHY TRIGGER FINGER    :  Dr. Annabelle Shirley    Assistant(s): Circ-1: Zuly Pate RN  Scrub Tech-1: Maggy Russo    Specimens: * No specimens in log *    Assessment:  Intra-procedure medications         Anesthesia gave intra-procedure sedation and medications, see anesthesia flow sheet     Intravenous fluids: LR@ KVO     Vital signs stable       Recommendation:    Permission to share finding with family : yes

## 2022-12-14 NOTE — ANESTHESIA PROCEDURE NOTES
Peripheral Block    Start time: 12/14/2022 11:37 AM  End time: 12/14/2022 11:46 AM  Performed by: Montserrat Pike MD  Authorized by: Montserrat Pike MD       Pre-procedure:    Indications: at surgeon's request, post-op pain management and primary anesthetic    Preanesthetic Checklist: patient identified, risks and benefits discussed, site marked, timeout performed, anesthesia consent given, patient being monitored and fire risk safety assessment completed and verbalized    Timeout Time: 11:39 EST      Block Type:   Block Type:  Supraclavicular  Laterality:  Right  Monitoring:  Standard ASA monitoring, responsive to questions and oxygen  Injection Technique:  Single shot  Procedures: ultrasound guided    Patient Position: supine  Prep: chlorhexidine    Location:  Supraclavicular  Needle Type:  Stimuplex  Needle Gauge:  22 G  Needle Localization:  Ultrasound guidance  Medication Injected:  Midazolam (VERSED) injection - IntraVENous   1 mg - 12/14/2022 11:40:00 AM  mepivacaine PF (CARBOCAINE) 1.5 % injection - Peripheral Nerve Block, Right Arm   30 mL - 12/14/2022 11:43:00 AM  Med Admin Time: 12/14/2022 11:43 AM    Assessment:  Number of attempts:  1  Injection Assessment:  Incremental injection every 5 mL, negative aspiration for CSF, no paresthesia, ultrasound image on chart, local visualized surrounding nerve on ultrasound, negative aspiration for blood and no intravascular symptoms  Patient tolerance:  Patient tolerated the procedure well with no immediate complications

## 2022-12-14 NOTE — PERIOP NOTES
1254  Pt arrived to PACU. Pt not arrousable. VSS. Dresing on right arm CDI. Arm elevated. 1309 Pt waking up. Denies pain. Not moving right fingers or moving arm. 1330 Discharge instructions reviewed with wife. 1350 Pt alert and oriented. VSS. Sling placed. Pt denies pain. Pt meets discharge criteria.

## 2022-12-15 NOTE — OP NOTES
[address]  OPERATIVE REPORT    Name:  Alva Pitt  MR#:  [id number]  :  1945  ACCOUNT #:  [de-identified]  DATE OF SERVICE:  2022    PREOPERATIVE DIAGNOSES:  1. Right wrist carpal tunnel syndrome. 2.  Right ring and middle finger stenosing tenosynovitis. 3.  Right elbow ulnar nerve entrapment syndrome. POSTOPERATIVE DIAGNOSES:  1. Right wrist carpal tunnel syndrome. 2.  Right ring and middle finger stenosing tenosynovitis. 3.  Right elbow ulnar nerve entrapment syndrome. PROCEDURES PERFORMED:  1. Right wrist open carpal tunnel release. 2.  Right middle and ring trigger finger A1 pulley release. 3.  Right elbow in situ ulnar nerve decompression. SURGEON:  Dorie Carpenter MD    ASSISTANT:  There were no surgical assistants. ANESTHESIA:  Regional nerve block and sedation. COMPLICATIONS:  There were no complications. SPECIMENS REMOVED:  There were no specimens. IMPLANTS:  There were no implants. ESTIMATED BLOOD LOSS:  Less than 5 mL. DRAINS:  There were no drains placed. 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 He elects to be taken to the operating this time for surgical treatment. REPORT OF OPERATION:  The patient was identified, taken into the operating room, placed supine on the operating room table. He received a regional nerve block and sedation in the preoperative area. He was placed supine on the operating room table. His right upper extremity was prepped and draped sterilely. After Esmarch exsanguination the tourniquet was inflated to 250 mm. A 2-3 cm longitudinal incision was made in the proximal right palm directed towards the third volar webspace. Skin flaps were elevated. The palmar fascia fibers were divided revealing the transverse carpal ligament.   A mosquito hemostat was passed beneath the distal edge from distal to proximal transverse carpal ligament was released with a 64 Arthur blade over top mosquito. Proximally the carpal tunnel guide was inserted. The [facility] was passed in the skid of the guide. This completed the release of the transverse carpal ligament including a couple of centimeters of distal antebrachial fascia. The median nerve was inspected. It was minimally flattened but not an hourglass shape. There were no masses in the carpal canal.  The wound was irrigated with saline and then closed with 3-0 Vicryl sutures subcutaneously and 4-0 nylon horizontal mattress sutures on the skin. Next, two separate 1-1.5 cm longitudinal incision was made over the A1 pulley region of the ring and middle fingers. Skin flaps were elevated as the flexor tendon sheaths were exposed. The first annular pulleys were markedly thickened. The central third of each A1 pulley was incised with a 64 Arthur blade. Distal dissections were to the A2 pulley and proximally the palmar aponeurotic pulley and sheaths were released with tenotomy scissors. The edges of the A1 pulley were treated with bipolar electrocautery trying to prevent recurrence. The tendons glided smoothly without clicking or locking. The wounds were irrigated and closed with 3-0 Vicryl sutures subcutaneously and 4-0 nylon horizontal mattress sutures on the skin. Next, a 3-4 cm curvilinear incision was made beneath the mid epicondyle of the right elbow. Skin flaps were elevated. A branch of the medial antebrachial cutaneous nerve was identified, protected and preserved. The ulnar nerve was identified. It was dissected proximally releasing towards the arcade of [place], distally Arevalo's ligament and true cubital tunnel retinaculum was next released followed by the fascial head origin of the flexor carpi ulnaris for several centimeters distal to the medial epicondyle.   The ulnar nerve stayed within its groove upon release and had no tendency towards subluxation. The wound was thoroughly irrigated with saline and closed with 3-0 Vicryl sutures subcutaneously and 4-0 Monocryl buried subcuticular suture. Mastisol and Steri-Strips were applied followed by soft dressings from the hand to the mid arm. The tourniquet was deflated. The hand was pink and had good refill. He was transported into the recovery room postoperatively in good condition.       Herminia Parker MD      [id number]  D:  12/14/2022 12:56  T:  12/14/2022 22:22  JOB #:  [id number]

## 2022-12-20 ENCOUNTER — HOSPITAL ENCOUNTER (EMERGENCY)
Age: 77
Discharge: HOME | End: 2022-12-20
Attending: EMERGENCY MEDICINE
Payer: MEDICARE

## 2022-12-20 ENCOUNTER — APPOINTMENT (OUTPATIENT)
Dept: GENERAL RADIOLOGY | Age: 77
End: 2022-12-20
Attending: EMERGENCY MEDICINE
Payer: MEDICARE

## 2022-12-20 VITALS
RESPIRATION RATE: 18 BRPM | SYSTOLIC BLOOD PRESSURE: 107 MMHG | HEART RATE: 56 BPM | TEMPERATURE: 98.2 F | HEIGHT: 74 IN | WEIGHT: 195 LBS | BODY MASS INDEX: 25.03 KG/M2 | OXYGEN SATURATION: 99 % | DIASTOLIC BLOOD PRESSURE: 66 MMHG

## 2022-12-20 DIAGNOSIS — R79.89 ELEVATED TSH: ICD-10-CM

## 2022-12-20 DIAGNOSIS — I48.0 PAROXYSMAL ATRIAL FIBRILLATION (HCC): Primary | ICD-10-CM

## 2022-12-20 LAB
ALBUMIN SERPL-MCNC: 3.7 G/DL (ref 3.5–5)
ALBUMIN/GLOB SERPL: 1 {RATIO} (ref 1.1–2.2)
ALP SERPL-CCNC: 108 U/L (ref 45–117)
ALT SERPL-CCNC: 28 U/L (ref 12–78)
ANION GAP SERPL CALC-SCNC: 4 MMOL/L (ref 5–15)
AST SERPL-CCNC: 19 U/L (ref 15–37)
ATRIAL RATE: 59 BPM
ATRIAL RATE: 67 BPM
BASOPHILS # BLD: 0 K/UL (ref 0–0.1)
BASOPHILS NFR BLD: 1 % (ref 0–1)
BILIRUB SERPL-MCNC: 0.3 MG/DL (ref 0.2–1)
BNP SERPL-MCNC: 123 PG/ML
BUN SERPL-MCNC: 30 MG/DL (ref 6–20)
BUN/CREAT SERPL: 17 (ref 12–20)
CALCIUM SERPL-MCNC: 9.4 MG/DL (ref 8.5–10.1)
CALCULATED P AXIS, ECG09: 48 DEGREES
CALCULATED P AXIS, ECG09: 50 DEGREES
CALCULATED R AXIS, ECG10: -30 DEGREES
CALCULATED R AXIS, ECG10: -35 DEGREES
CALCULATED T AXIS, ECG11: 35 DEGREES
CALCULATED T AXIS, ECG11: 47 DEGREES
CHLORIDE SERPL-SCNC: 102 MMOL/L (ref 97–108)
CO2 SERPL-SCNC: 28 MMOL/L (ref 21–32)
CREAT SERPL-MCNC: 1.74 MG/DL (ref 0.7–1.3)
DIAGNOSIS, 93000: NORMAL
DIAGNOSIS, 93000: NORMAL
DIFFERENTIAL METHOD BLD: ABNORMAL
EOSINOPHIL # BLD: 0.3 K/UL (ref 0–0.4)
EOSINOPHIL NFR BLD: 5 % (ref 0–7)
ERYTHROCYTE [DISTWIDTH] IN BLOOD BY AUTOMATED COUNT: 12.7 % (ref 11.5–14.5)
GLOBULIN SER CALC-MCNC: 3.8 G/DL (ref 2–4)
GLUCOSE SERPL-MCNC: 175 MG/DL (ref 65–100)
HCT VFR BLD AUTO: 36.2 % (ref 36.6–50.3)
HGB BLD-MCNC: 11.8 G/DL (ref 12.1–17)
IMM GRANULOCYTES # BLD AUTO: 0.1 K/UL (ref 0–0.04)
IMM GRANULOCYTES NFR BLD AUTO: 1 % (ref 0–0.5)
LYMPHOCYTES # BLD: 1.5 K/UL (ref 0.8–3.5)
LYMPHOCYTES NFR BLD: 25 % (ref 12–49)
MAGNESIUM SERPL-MCNC: 1.9 MG/DL (ref 1.6–2.4)
MCH RBC QN AUTO: 32.1 PG (ref 26–34)
MCHC RBC AUTO-ENTMCNC: 32.6 G/DL (ref 30–36.5)
MCV RBC AUTO: 98.4 FL (ref 80–99)
MONOCYTES # BLD: 0.7 K/UL (ref 0–1)
MONOCYTES NFR BLD: 11 % (ref 5–13)
NEUTS SEG # BLD: 3.6 K/UL (ref 1.8–8)
NEUTS SEG NFR BLD: 57 % (ref 32–75)
NRBC # BLD: 0 K/UL (ref 0–0.01)
NRBC BLD-RTO: 0 PER 100 WBC
P-R INTERVAL, ECG05: 160 MS
P-R INTERVAL, ECG05: 166 MS
PLATELET # BLD AUTO: 270 K/UL (ref 150–400)
PMV BLD AUTO: 10.2 FL (ref 8.9–12.9)
POTASSIUM SERPL-SCNC: 4.3 MMOL/L (ref 3.5–5.1)
PROT SERPL-MCNC: 7.5 G/DL (ref 6.4–8.2)
Q-T INTERVAL, ECG07: 384 MS
Q-T INTERVAL, ECG07: 404 MS
QRS DURATION, ECG06: 100 MS
QRS DURATION, ECG06: 108 MS
QTC CALCULATION (BEZET), ECG08: 399 MS
QTC CALCULATION (BEZET), ECG08: 405 MS
RBC # BLD AUTO: 3.68 M/UL (ref 4.1–5.7)
SODIUM SERPL-SCNC: 134 MMOL/L (ref 136–145)
TROPONIN-HIGH SENSITIVITY: 21 NG/L (ref 0–76)
TSH SERPL DL<=0.05 MIU/L-ACNC: 6.24 UIU/ML (ref 0.36–3.74)
VENTRICULAR RATE, ECG03: 59 BPM
VENTRICULAR RATE, ECG03: 67 BPM
WBC # BLD AUTO: 6.1 K/UL (ref 4.1–11.1)

## 2022-12-20 PROCEDURE — 85025 COMPLETE CBC W/AUTO DIFF WBC: CPT

## 2022-12-20 PROCEDURE — 93005 ELECTROCARDIOGRAM TRACING: CPT

## 2022-12-20 PROCEDURE — 36415 COLL VENOUS BLD VENIPUNCTURE: CPT

## 2022-12-20 PROCEDURE — 84484 ASSAY OF TROPONIN QUANT: CPT

## 2022-12-20 PROCEDURE — 83735 ASSAY OF MAGNESIUM: CPT

## 2022-12-20 PROCEDURE — 99285 EMERGENCY DEPT VISIT HI MDM: CPT

## 2022-12-20 PROCEDURE — 71046 X-RAY EXAM CHEST 2 VIEWS: CPT

## 2022-12-20 PROCEDURE — 84443 ASSAY THYROID STIM HORMONE: CPT

## 2022-12-20 PROCEDURE — 80053 COMPREHEN METABOLIC PANEL: CPT

## 2022-12-20 PROCEDURE — 83880 ASSAY OF NATRIURETIC PEPTIDE: CPT

## 2022-12-20 RX ORDER — METOPROLOL SUCCINATE 25 MG/1
25 TABLET, EXTENDED RELEASE ORAL EVERY MORNING
Qty: 90 TABLET | Refills: 0 | Status: SHIPPED | OUTPATIENT
Start: 2022-12-20 | End: 2023-03-20

## 2022-12-20 NOTE — DISCHARGE INSTRUCTIONS
You were seen in the emergency department for a fast and irregular heart rate. We have diagnosed with atrial fibrillation. You had treated yourself with medication prior to coming to the emergency department and your heart rate improved. Please increase your Metoprolol dose to 25 mg daily. Resume Apixaban 5 mg twice daily. Your TSH was elevated at this visit to 6.24. Please follow up with your primary care doctor within the next two weeks for further thyroid testing and possible initiation of thyroid medication. Yuriy Durán  1993  [id number]    Situation:  Verbal report received from: ANTONIETTA Escobedo  Procedure: Procedure(s):  ESOPHAGOGASTRODUODENOSCOPY (EGD)    Background:    Preoperative diagnosis: BLOATING, CONSTIPATION, EPIGASTRIC PAIN, LOWER ABDOMINAL PAIN  Postoperative diagnosis: 1. Esophagitis  2. Gastritis    :  Dr. Ana Arevalo  Assistant(s): Endoscopy Technician-1: Jose Gonzales  Endoscopy RN-1: Ronn Kay RN    Specimens:   ID Type Source Tests Collected by Time Destination   1 : Duodenal Biopsies Rule Our Celiac Disease Preservative   Davina Lundborg, MD 4/19/2018 1602 Pathology   2 : Lower Esophagus Biopsies Preservative   Davina Lundborg, MD 4/19/2018 1605 Pathology     H. Pylori  no    Assessment:  Intra-procedure medications       Anesthesia gave intra-procedure sedation and medications, see anesthesia flow sheet yes    Intravenous fluids:  300  NS @ KVO     Vital signs stable yes    Abdominal assessment: round and soft  yes    Recommendation:  Discharge patient per MD order yes.   Return to floor no  Family or Friend : wife  Permission to share finding with family or friend yes

## 2022-12-20 NOTE — ED PROVIDER NOTES
Patient is a 76year old male with PMH of CKD stage 3, diabetes, hypertension who presents to the emergency department with irregular heart rate. He reports that since this AM he has been aware of his heart rate being irregular and has been watching it on his Apple Watch. It has ranged from the 60s-120s and has been very variable throughout the day. Besides this awareness of his HR, he has not had any chest pain, shortness of breath, lightheadedness, pre-syncope/syncope. Was able to go about his day normally without affecting his activities. He suspected that this was atrial fibrillation so he took an extra dose of his Metoprolol succinate 12.5 mg just prior to bed, but his wife was very concerned about his heart rate so she asked him to come to the ED for evaluation. No recent illness, denies chest pain, STRINGER, nausea, vomiting, abdominal pain, weight loss, change in diet. Of note, patient suspected that he has had paroxysmal atrial fibrillation for years, but has never been formally diagnosed. He has had several holter monitors in the past that have not shown arrhythmias. Palpitations   Pertinent negatives include no fever, no chest pain, no abdominal pain, no nausea, no vomiting, no headaches, no back pain, no cough and no shortness of breath. Past Medical History:   Diagnosis Date    Chronic kidney disease     stage III    Diabetes ([address]     Gout     Irregular heart beats        Past Surgical History:   Procedure Laterality Date    HX COLONOSCOPY      x3    HX VASECTOMY           No family history on file.     Social History     Socioeconomic History    Marital status:      Spouse name: Not on file    Number of children: Not on file    Years of education: Not on file    Highest education level: Not on file   Occupational History    Not on file   Tobacco Use    Smoking status: Never    Smokeless tobacco: Never   Vaping Use    Vaping Use: Never used   Substance and Sexual Activity    Alcohol use: Yes     Alcohol/week: 1.0 standard drink     Types: 1 Shots of liquor per week    Drug use: Not Currently    Sexual activity: Not on file   Other Topics Concern    Not on file   Social History Narrative    Not on file     Social Determinants of Health     Financial Resource Strain: Not on file   Food Insecurity: Not on file   Transportation Needs: Not on file   Physical Activity: Not on file   Stress: Not on file   Social Connections: Not on file   Intimate Partner Violence: Not on file   Housing Stability: Not on file         ALLERGIES: Patient has no known allergies. Review of Systems   Constitutional:  Negative for chills and fever. HENT:  Negative for sore throat and trouble swallowing. Respiratory:  Negative for cough and shortness of breath. Cardiovascular:  Positive for palpitations. Negative for chest pain and leg swelling. Gastrointestinal:  Negative for abdominal pain, diarrhea, nausea and vomiting. Genitourinary:  Negative for dysuria and hematuria. Musculoskeletal:  Negative for back pain and myalgias. Skin:  Negative for rash and wound. Allergic/Immunologic: Negative for immunocompromised state. Neurological:  Negative for syncope and headaches. Vitals:    12/20/22 0126   BP: 107/66   Pulse: (!) 132   Resp: 18   Temp: 98.2 °F (36.8 °C)   SpO2: 100%   Weight: 88.5 kg (195 lb)   Height: 6' 2\" (1.88 m)            Physical Exam  Vitals and nursing note reviewed. Constitutional:       General: He is not in acute distress. HENT:      Head: Normocephalic and atraumatic. Nose: No rhinorrhea. Mouth/Throat:      Mouth: Mucous membranes are moist.   Eyes:      General: No scleral icterus. Conjunctiva/sclera: Conjunctivae normal.   Cardiovascular:      Rate and Rhythm: Normal rate and regular rhythm. Pulses: Normal pulses. Heart sounds: Normal heart sounds. Pulmonary:      Effort: Pulmonary effort is normal. No respiratory distress.       Breath sounds: Normal breath sounds. No wheezing. Abdominal:      General: There is no distension. Palpations: Abdomen is soft. Tenderness: There is no abdominal tenderness. Musculoskeletal:      Comments: R wrist/hand in ACE wrap   Skin:     General: Skin is warm and dry. Neurological:      Mental Status: He is alert. Psychiatric:         Mood and Affect: Mood normal.         Behavior: Behavior normal.        MDM  Number of Diagnoses or Management Options  Elevated TSH: new and requires workup  Paroxysmal atrial fibrillation [facility]): new and requires workup  Diagnosis management comments: Patient is a 76year old male with T2DM, HTN, CKD3 presenting to the ED with atrial fibrillation with RVR. Upon arrival HR was in the 120s in atrial fibrillation, normotensive, no respiratory distress. He had already taken additional dose of Metoprolol at home and converted to NSR without further intervention. Patient asymptomatic at the time of evaluation and HR on his watch was in the 60s. Labs reviewed including CBC, BMP, magnesium, and troponin without evidence of reversible cause of his atrial fibrillation. No evidence of acute ischemia or volume overload suggesting HF. TSH pending. Will obtain repeat EKG to verify patient's rhythm. Discussed increasing home Metoprolol dose to 25 mg daily and restarting Apixaban 5 mg BID (had been taking previously), which patient agreed with. If remains stable and does not require further intervention, will likely discharge home with cardiology and PCP follow up. Amount and/or Complexity of Data Reviewed  Clinical lab tests: reviewed  Tests in the radiology section of CPT®: reviewed  Tests in the medicine section of CPT®: reviewed  Decide to obtain previous medical records or to obtain history from someone other than the patient: yes      ED Course as of 12/20/22 0404   Tue Dec 20, 2022   0323 Now in normal sinus. Symptoms resolved.  Discussed restarting Eliquis - will prescribe 5 mg BID and new rx for Metoprolol succinate 25 mg daily. Patient to call  [HK]   5949 TSH elevated. Will make patient aware and include in discharge paperwork for him to follow up with PCP. [HK]      ED Course User Index  [HK] Liliana Franco MD       Procedures    I personally saw and examined the patient. I have reviewed and agree with the residents findings, including all diagnostic interpretations, and plans as written. I was present during the key portions of separately billed procedures. Christi Batista MD     Patient presents to the ED with palpitations. History of palpitations, followed by [place] cardiovascular specialist.  Never diagnosed with A. fib, but suspected. Patient has been on metoprolol and Eliquis in the past for symptoms. Patient denies shortness of breath or chest pain but states an \"awareness of his heart\" throughout the day today. Took an extra metoprolol 12.5 mg this evening. He reports his heart rate has ranged from 60s to 120s today. Awake and alert, very pleasant  Lungs clear to auscultation  Mildly bradycardic, regular rhythm  Trace bilateral lower extremity edema (left greater than right)    Patient presents to the ED with palpitations. Noted to have intermittent atrial fibrillation on EKG in ED. Patient has had long suspected episodes of A. fib and has been followed by cardiology. He is back in sinus rhythm on most recent EKG with heart rate of 59. He denies chest pain, shortness of breath, dizziness. Will discharge with instructions to follow-up with cardiologist to discuss beta-blocker and Eliquis. Return to ED for worsening symptoms.   Christi Batista MD

## 2022-12-21 ENCOUNTER — OFFICE VISIT (OUTPATIENT)
Dept: ORTHOPEDIC SURGERY | Age: 77
End: 2022-12-21
Payer: MEDICARE

## 2022-12-21 VITALS — BODY MASS INDEX: 25.03 KG/M2 | WEIGHT: 195 LBS | HEIGHT: 74 IN

## 2022-12-21 DIAGNOSIS — M79.642 BILATERAL HAND PAIN: ICD-10-CM

## 2022-12-21 DIAGNOSIS — G56.03 BILATERAL CARPAL TUNNEL SYNDROME: Primary | ICD-10-CM

## 2022-12-21 DIAGNOSIS — Z98.890 STATUS POST CARPAL TUNNEL RELEASE: ICD-10-CM

## 2022-12-21 DIAGNOSIS — M65.341 TRIGGER RING FINGER OF RIGHT HAND: ICD-10-CM

## 2022-12-21 DIAGNOSIS — Z98.890 STATUS POST DECOMPRESSION OF ULNAR NERVE: ICD-10-CM

## 2022-12-21 DIAGNOSIS — G56.21 ULNAR NEUROPATHY AT ELBOW OF RIGHT UPPER EXTREMITY: ICD-10-CM

## 2022-12-21 DIAGNOSIS — M79.641 BILATERAL HAND PAIN: ICD-10-CM

## 2022-12-21 PROCEDURE — 99024 POSTOP FOLLOW-UP VISIT: CPT | Performed by: ORTHOPAEDIC SURGERY

## 2022-12-21 NOTE — PROGRESS NOTES
HPI: Kitty Ivey (: 1945) is a 76 y.o. male, patient, here for evaluation of the following chief complaint(s):    No chief complaint on file. Patient is seen today to evaluate his hands. 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 He denies any fall or injury. He states he did build a gazebo around 2017 and noted numbness especially in his hands after awakening from sleep. He has tried to treat conservatively. 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 Lastly he has been complaining of catching triggering locking of his right ring finger. He elected undergo a right open carpal tunnel release with a right elbow ulnar nerve decompression and right ring and middle trigger finger release on 2022. Vitals: There were no vitals taken for this visit. There is no height or weight on file to calculate BMI. No Known Allergies    Current Outpatient Medications   Medication Sig    metoprolol succinate (TOPROL-XL) 25 mg XL tablet Take 1 Tablet by mouth Every morning for 90 days. apixaban (Eliquis) 5 mg tablet Take 1 Tablet by mouth two (2) times a day for 90 days. dutasteride (AVODART) 0.5 mg capsule Take 0.5 mg by mouth daily. simvastatin (ZOCOR) 20 mg tablet Take 20 mg by mouth nightly. tamsulosin (FLOMAX) 0.4 mg capsule Take 0.4 mg by mouth daily. METFORMIN HCL (METFORMIN PO) Take 1,000 mg by mouth two (2) times a day. glipiZIDE (GLUCOTROL) 10 mg tablet Take 10 mg by mouth two (2) times a day. multivitamin (ONE A DAY) tablet Take 1 Tab by mouth daily. SITagliptin (JANUVIA) 50 mg tablet Take 50 mg by mouth daily.      No current facility-administered medications for this visit. Past Medical History:   Diagnosis Date    Chronic kidney disease     stage III    Diabetes ([address]     Gout     Irregular heart beats         Past Surgical History:   Procedure Laterality Date    HX COLONOSCOPY      x3    HX VASECTOMY         No family history on file. Social History     Tobacco Use    Smoking status: Never    Smokeless tobacco: Never   Vaping Use    Vaping Use: Never used   Substance Use Topics    Alcohol use: Yes     Alcohol/week: 1.0 standard drink     Types: 1 Shots of liquor per week    Drug use: Not Currently        Review of Systems   All other systems reviewed and are negative. Physical Exam    Postoperatively the wounds are healing well and there is really no redness drainage or sign of infection. Working on complete motion recovery. Improved sensibility throughout. Imaging:    XR Results (most recent):  Results from Hospital Encounter encounter on 12/20/22    XR CHEST PA LAT    Narrative  EXAM: XR CHEST PA LAT    INDICATION: Chest pain    COMPARISON: None    TECHNIQUE: PA and lateral chest views    FINDINGS: The cardiac size is within normal limits. The pulmonary vasculature is  within normal limits. The lungs and pleural spaces are clear. The visualized bones and upper abdomen  are age-appropriate. There is heavy costochondral calcification. DISH is noted. Impression  Normal PA and lateral chest views. ASSESSMENT/PLAN:  Below is the assessment and plan developed based on review of pertinent history, physical exam, labs, studies, and medications. Patient examination was consistent with bilateral right greater than left wrist carpal tunnel syndrome with right elbow ulnar nerve entrapment and right ring finger stenosing tenosynovitis with locking. He has been increasingly symptomatic for 7 to 8 years. I reviewed treatment options and answered his questions.   He feels he has exhausted his conservative options and elects to pursue operative treatment. The current plan is to perform a right open carpal tunnel release right elbow ulnar nerve decompression of the right ring trigger finger. In the future he could then consider at least an left carpal tunnel release. He elected undergo a right open carpal tunnel release with a right elbow ulnar nerve decompression and right ring and middle trigger finger release on 12/14/2022. Continue with wound care, gentle motion and strength. The wounds appear to need a little more time to heal and I plan to see him back in another week for wound inspection and suture removal.  He was seen with his wife as well and questions were answered. 1. Bilateral carpal tunnel syndrome  2. Ulnar neuropathy at elbow of right upper extremity  3. Trigger ring finger of right hand  4. Bilateral hand pain  5. Status post carpal tunnel release  6. Status post decompression of ulnar nerve      No follow-ups on file. An electronic signature was used to authenticate this note.   -- Magaly Grace MD

## 2022-12-21 NOTE — PATIENT INSTRUCTIONS
Hand Exercises      Tendon glides   In this exercise, the steps follow one another to a make a continuous movement. With your affected hand, point your fingers and thumb straight up. Your wrist should be relaxed, following the line of your fingers and thumb. Curl your fingers so that the top two joints in them are bent, and your fingers wrap down. Your fingertips should touch or be near the base of your fingers. Your fingers will look like a hook. Make a fist by bending your knuckles. Your thumb can gently rest against your index (pointing) finger. Unwind your fingers slightly so that your fingertips can touch the base of your palm. Your thumb can rest against your index finger. Move back to your starting position, with your fingers and thumb pointing up. Repeat the series of motions 8 to 12 times. Switch hands and repeat steps 1 through 6, even if only one hand is sore. Intrinsic flexion   Rest your affected hand on a table and bend the large joints where your fingers connect to your hand. Keep your thumb and the other joints in your fingers straight. Slowly straighten your fingers. Your wrist should be relaxed, following the line of your fingers and thumb. Move back to your starting position, with your hand bent. Repeat 8 to 12 times. Switch hands and repeat steps 1 through 4, even if only one hand is sore. Finger extension   Place your affected hand flat on a table. Lift and then lower one finger at a time off the table. Repeat 8 to 12 times. Switch hands and repeat steps 1 through 3, even if only one hand is sore. MP extension   Place your good hand on a table, palm up. Put your affected hand on top of your good hand with your fingers wrapped around the thumb of your good hand like you are making a fist.  Slowly uncurl the joints of your affected hand where your fingers connect to your hand so that only the top two joints of your fingers are bent. Your fingers will look like a hook.   Move back to your starting position, with your fingers wrapped around your good thumb. Repeat 8 to 12 times. Switch hands and repeat steps 1 through 4, even if only one hand is sore. Wrist: Exercises  Introduction  Here are some examples of exercises for you to try. The exercises may be suggested for a condition or for rehabilitation. Start each exercise slowly. Ease off the exercises if you start to have pain. You will be told when to start these exercises and which ones will work best for you. How to do the exercises  Prayer stretch  Start with your palms together in front of your chest just below your chin. Slowly lower your hands toward your waistline, keeping your hands close to your stomach and your palms together until you feel a mild to moderate stretch under your forearms. Hold for at least 15 to 30 seconds. Repeat 2 to 4 times. Wrist flexor stretch  Extend your arm in front of you with your palm up. Bend your wrist, pointing your hand toward the floor. With your other hand, gently bend your wrist farther until you feel a mild to moderate stretch in your forearm. Hold for at least 15 to 30 seconds. Repeat 2 to 4 times. Wrist extensor stretch  Repeat steps 1 through 4 of the stretch above, but begin with your extended hand palm down. Follow-up care is a key part of your treatment and safety. Be sure to make and go to all appointments, and call your doctor if you are having problems. It's also a good idea to know your test results and keep a list of the medicines you take. Current as of: March 9, 2022               Content Version: 13.4  © 2006-2022 [employer]. Care instructions adapted under license by [employer] (which disclaims liability or warranty for this information).  If you have questions about a medical condition or this instruction, always ask your healthcare professional. [place] [address] any warranty or liability for your use of this information.

## 2022-12-28 ENCOUNTER — OFFICE VISIT (OUTPATIENT)
Dept: ORTHOPEDIC SURGERY | Age: 77
End: 2022-12-28
Payer: MEDICARE

## 2022-12-28 DIAGNOSIS — M79.641 BILATERAL HAND PAIN: ICD-10-CM

## 2022-12-28 DIAGNOSIS — M65.341 TRIGGER RING FINGER OF RIGHT HAND: ICD-10-CM

## 2022-12-28 DIAGNOSIS — Z98.890 STATUS POST CARPAL TUNNEL RELEASE: ICD-10-CM

## 2022-12-28 DIAGNOSIS — Z98.890 STATUS POST DECOMPRESSION OF ULNAR NERVE: ICD-10-CM

## 2022-12-28 DIAGNOSIS — G56.21 ULNAR NEUROPATHY AT ELBOW OF RIGHT UPPER EXTREMITY: ICD-10-CM

## 2022-12-28 DIAGNOSIS — M79.642 BILATERAL HAND PAIN: ICD-10-CM

## 2022-12-28 DIAGNOSIS — G56.03 BILATERAL CARPAL TUNNEL SYNDROME: Primary | ICD-10-CM

## 2022-12-28 PROCEDURE — 99024 POSTOP FOLLOW-UP VISIT: CPT | Performed by: ORTHOPAEDIC SURGERY

## 2022-12-28 NOTE — LETTER
12/28/2022    Patient: Dallas Blake. YOB: 1945   Date of Visit: 12/28/2022     Sampson Martin MD  [address]  [id number]  [address] [place]    Dear Sampson Martin MD,      Thank you for referring Mr. Ac Bai to [facility] for evaluation. My notes for this consultation are attached. If you have questions, please do not hesitate to call me. I look forward to following your patient along with you.       Sincerely,    Lizette Lara MD

## 2022-12-28 NOTE — PROGRESS NOTES
HPI: Oneil Zendejas (: 1945) is a 76 y.o. male, patient, here for evaluation of the following chief complaint(s):    No chief complaint on file. Patient is seen today to evaluate his hands. 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 He denies any fall or injury. He states he did build a gazebo around 2017 and noted numbness especially in his hands after awakening from sleep. He has tried to treat conservatively. 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 Lastly he has been complaining of catching triggering locking of his right ring finger. He elected undergo a right open carpal tunnel release with a right elbow ulnar nerve decompression and right ring and middle trigger finger release on 2022. Vitals: There were no vitals taken for this visit. There is no height or weight on file to calculate BMI. No Known Allergies    Current Outpatient Medications   Medication Sig    metoprolol succinate (TOPROL-XL) 25 mg XL tablet Take 1 Tablet by mouth Every morning for 90 days. apixaban (Eliquis) 5 mg tablet Take 1 Tablet by mouth two (2) times a day for 90 days. dutasteride (AVODART) 0.5 mg capsule Take 0.5 mg by mouth daily. simvastatin (ZOCOR) 20 mg tablet Take 20 mg by mouth nightly. tamsulosin (FLOMAX) 0.4 mg capsule Take 0.4 mg by mouth daily. METFORMIN HCL (METFORMIN PO) Take 1,000 mg by mouth two (2) times a day. glipiZIDE (GLUCOTROL) 10 mg tablet Take 10 mg by mouth two (2) times a day. multivitamin (ONE A DAY) tablet Take 1 Tab by mouth daily. SITagliptin (JANUVIA) 50 mg tablet Take 50 mg by mouth daily.      No current facility-administered medications for this visit. Past Medical History:   Diagnosis Date    Chronic kidney disease     stage III    Diabetes ([facility] [address]     Gout     Irregular heart beats         Past Surgical History:   Procedure Laterality Date    HX COLONOSCOPY      x3    HX VASECTOMY         History reviewed. No pertinent family history. Social History     Tobacco Use    Smoking status: Never    Smokeless tobacco: Never   Vaping Use    Vaping Use: Never used   Substance Use Topics    Alcohol use: Yes     Alcohol/week: 1.0 standard drink     Types: 1 Shots of liquor per week    Drug use: Not Currently        Review of Systems   All other systems reviewed and are negative. Physical Exam    Postoperatively the wounds are healing well and there is really no redness drainage or sign of infection. Working on complete motion recovery. Improved sensibility throughout. Imaging:    XR Results (most recent):  Results from Hospital Encounter encounter on 12/20/22    XR CHEST PA LAT    Narrative  EXAM: XR CHEST PA LAT    INDICATION: Chest pain    COMPARISON: None    TECHNIQUE: PA and lateral chest views    FINDINGS: The cardiac size is within normal limits. The pulmonary vasculature is  within normal limits. The lungs and pleural spaces are clear. The visualized bones and upper abdomen  are age-appropriate. There is heavy costochondral calcification. DISH is noted. Impression  Normal PA and lateral chest views. ASSESSMENT/PLAN:  Below is the assessment and plan developed based on review of pertinent history, physical exam, labs, studies, and medications. Patient examination was consistent with bilateral right greater than left wrist carpal tunnel syndrome with right elbow ulnar nerve entrapment and right ring finger stenosing tenosynovitis with locking. He has been increasingly symptomatic for 7 to 8 years. I reviewed treatment options and answered his questions.   He feels he has exhausted his conservative options and elects to pursue operative treatment. The current plan is to perform a right open carpal tunnel release right elbow ulnar nerve decompression of the right ring trigger finger. In the future he could then consider at least an left carpal tunnel release. He elected undergo a right open carpal tunnel release with a right elbow ulnar nerve decompression and right ring and middle trigger finger release on 12/14/2022. He returned for suture removal on 12/28/2022. Continue with wound care, motion and strength. Overall he is pleased with his progress. He may continue with a home exercises deferring the need for outpatient therapy. He is giving consideration for similar surgery on the left hand but will be in [place] for most of February and March and likely would not be able to return until April. 1. Bilateral carpal tunnel syndrome  2. Ulnar neuropathy at elbow of right upper extremity  3. Trigger ring finger of right hand  4. Status post carpal tunnel release  5. Status post decompression of ulnar nerve  6. Bilateral hand pain      Return in about 6 weeks (around 2/8/2023). An electronic signature was used to authenticate this note.   -- Urmila Gilbert MD

## 2023-03-16 ENCOUNTER — TRANSCRIBE ORDER (OUTPATIENT)
Dept: SCHEDULING | Age: 78
End: 2023-03-16

## 2023-03-16 DIAGNOSIS — D47.2 MONOCLONAL PARAPROTEINEMIA: Primary | ICD-10-CM

## 2023-03-28 ENCOUNTER — HOSPITAL ENCOUNTER (OUTPATIENT)
Dept: CT IMAGING | Age: 78
Discharge: HOME | End: 2023-03-28
Attending: INTERNAL MEDICINE
Payer: MEDICARE

## 2023-03-28 VITALS
DIASTOLIC BLOOD PRESSURE: 67 MMHG | HEART RATE: 55 BPM | OXYGEN SATURATION: 100 % | SYSTOLIC BLOOD PRESSURE: 128 MMHG | WEIGHT: 195 LBS | BODY MASS INDEX: 25.03 KG/M2 | TEMPERATURE: 97.8 F | RESPIRATION RATE: 18 BRPM | HEIGHT: 74 IN

## 2023-03-28 DIAGNOSIS — D47.2 MONOCLONAL PARAPROTEINEMIA: ICD-10-CM

## 2023-03-28 LAB
BASOPHILS # BLD: 0.1 K/UL (ref 0–0.1)
BASOPHILS NFR BLD: 1 % (ref 0–1)
DIFFERENTIAL METHOD BLD: ABNORMAL
EOSINOPHIL # BLD: 0.2 K/UL (ref 0–0.4)
EOSINOPHIL NFR BLD: 5 % (ref 0–7)
ERYTHROCYTE [DISTWIDTH] IN BLOOD BY AUTOMATED COUNT: 12.7 % (ref 11.5–14.5)
HCT VFR BLD AUTO: 32.8 % (ref 36.6–50.3)
HGB BLD-MCNC: 11 G/DL (ref 12.1–17)
IMM GRANULOCYTES # BLD AUTO: 0 K/UL (ref 0–0.04)
IMM GRANULOCYTES NFR BLD AUTO: 1 % (ref 0–0.5)
LYMPHOCYTES # BLD: 1.1 K/UL (ref 0.8–3.5)
LYMPHOCYTES NFR BLD: 26 % (ref 12–49)
MCH RBC QN AUTO: 32.6 PG (ref 26–34)
MCHC RBC AUTO-ENTMCNC: 33.5 G/DL (ref 30–36.5)
MCV RBC AUTO: 97.3 FL (ref 80–99)
MONOCYTES # BLD: 0.7 K/UL (ref 0–1)
MONOCYTES NFR BLD: 16 % (ref 5–13)
NEUTS SEG # BLD: 2.2 K/UL (ref 1.8–8)
NEUTS SEG NFR BLD: 51 % (ref 32–75)
NRBC # BLD: 0 K/UL (ref 0–0.01)
NRBC BLD-RTO: 0 PER 100 WBC
PLATELET # BLD AUTO: 256 K/UL (ref 150–400)
PMV BLD AUTO: 9.8 FL (ref 8.9–12.9)
RBC # BLD AUTO: 3.37 M/UL (ref 4.1–5.7)
WBC # BLD AUTO: 4.3 K/UL (ref 4.1–11.1)

## 2023-03-28 PROCEDURE — 88342 IMHCHEM/IMCYTCHM 1ST ANTB: CPT

## 2023-03-28 PROCEDURE — 88341 IMHCHEM/IMCYTCHM EA ADD ANTB: CPT

## 2023-03-28 PROCEDURE — 88360 TUMOR IMMUNOHISTOCHEM/MANUAL: CPT

## 2023-03-28 PROCEDURE — 74011250636 HC RX REV CODE- 250/636: Performed by: STUDENT IN AN ORGANIZED HEALTH CARE EDUCATION/TRAINING PROGRAM

## 2023-03-28 PROCEDURE — 77012 CT SCAN FOR NEEDLE BIOPSY: CPT

## 2023-03-28 PROCEDURE — 77030003375 HC MRK BIOP BEEK -A

## 2023-03-28 PROCEDURE — 88365 INSITU HYBRIDIZATION (FISH): CPT

## 2023-03-28 PROCEDURE — 85025 COMPLETE CBC W/AUTO DIFF WBC: CPT

## 2023-03-28 PROCEDURE — 88185 FLOWCYTOMETRY/TC ADD-ON: CPT

## 2023-03-28 PROCEDURE — 77030028872 HC BN BIOP NDL ON CNTRL TY TELE -C

## 2023-03-28 PROCEDURE — 88305 TISSUE EXAM BY PATHOLOGIST: CPT

## 2023-03-28 PROCEDURE — 88184 FLOWCYTOMETRY/ TC 1 MARKER: CPT

## 2023-03-28 PROCEDURE — 88364 INSITU HYBRIDIZATION (FISH): CPT

## 2023-03-28 PROCEDURE — 88311 DECALCIFY TISSUE: CPT

## 2023-03-28 PROCEDURE — 36415 COLL VENOUS BLD VENIPUNCTURE: CPT

## 2023-03-28 PROCEDURE — 77030014115

## 2023-03-28 PROCEDURE — 88313 SPECIAL STAINS GROUP 2: CPT

## 2023-03-28 RX ORDER — MIDAZOLAM HYDROCHLORIDE 1 MG/ML
5 INJECTION, SOLUTION INTRAMUSCULAR; INTRAVENOUS
Status: DISCONTINUED | OUTPATIENT
Start: 2023-03-28 | End: 2023-03-28

## 2023-03-28 RX ORDER — FENTANYL CITRATE 50 UG/ML
25-100 INJECTION, SOLUTION INTRAMUSCULAR; INTRAVENOUS
Status: DISCONTINUED | OUTPATIENT
Start: 2023-03-28 | End: 2023-03-28

## 2023-03-28 RX ORDER — SODIUM CHLORIDE 9 MG/ML
25 INJECTION, SOLUTION INTRAVENOUS CONTINUOUS
Status: DISCONTINUED | OUTPATIENT
Start: 2023-03-28 | End: 2023-03-28

## 2023-03-28 RX ADMIN — MIDAZOLAM 1 MG: 1 INJECTION INTRAMUSCULAR; INTRAVENOUS at 09:00

## 2023-03-28 RX ADMIN — MIDAZOLAM 1 MG: 1 INJECTION INTRAMUSCULAR; INTRAVENOUS at 08:58

## 2023-03-28 RX ADMIN — SODIUM CHLORIDE 25 ML/HR: 9 INJECTION, SOLUTION INTRAVENOUS at 09:00

## 2023-03-28 RX ADMIN — FENTANYL CITRATE 25 MCG: 50 INJECTION, SOLUTION INTRAMUSCULAR; INTRAVENOUS at 09:02

## 2023-03-28 RX ADMIN — FENTANYL CITRATE 25 MCG: 50 INJECTION, SOLUTION INTRAMUSCULAR; INTRAVENOUS at 08:59

## 2023-03-28 NOTE — DISCHARGE INSTRUCTIONS
[facility]  [facility]  [phone]    Radiologist: Nigel Burch NP    Date: 3/28/2023       Bone Marrow Biopsy Discharge Instructions      Go home and rest  and restrict your activity the next 24 hours. You have been given sedating medications, so do not drive or make important decisions today. Resume your previous diet and prescribed medications. You may shower in 24 hours. Do not soak or swim until the biopsy site has healed completely to minimize any risk of infection. Watch site for redness, drainage, pus, foul odor, increasing pain and fevers. Should this occur call you doctor immediatly. You may take Tylenol if allowed, as directed on the label, for pain or discomfort. Avoid Ibuprofen (Advil, Motrin etc.) and Aspirin today as they may increase your risk of bleeding. Be sure to follow up with your physician, and let him know how you are progressing and to receive your results. If you have any questions about your procedure today please call and ask to speak to a radiology nurse.        I

## 2023-03-28 NOTE — PROGRESS NOTES
0815: pt arrived to xray recovery with wife ambulatory, aox4, VSS. IV access obtained, labs drawn, consent presented and signed. 9870: Procedure: bone marrow biopsy    Sedation Medications Given: 2mg of versed, 50mch fentanyl     Sedation Start: 0858    Sedation End: 0904    Procedure Tolerated: well    Sedation Tolerated: well    Samples Sent To Lab: cbc w diff, peripheral smear    Patient is A&O x 4 at this time and in NAD on RA. Patient denies pain or discomfort related to the procedure. Apple juice given, snack offered but denied. Will call wife for discharge. 0940: pt given discharge instructions, opportunity for questions, none at this time. Pt wheeled out to wife at car.

## 2023-03-28 NOTE — H&P
INTERVENTIONAL RADIOLOGY  Preoperative History and Physical      Patient:  Gen Stokes :  1945  Age:  76 y.o. MRN:  [id number]  Today's Date:  3/28/2023      CC / HPI   Gen Stokes is a 76 y.o. male with a history of monoclonal paraproteinemia who presents for CT guided bone marrow biopsy. PAST MEDICAL HISTORY  Past Medical History:   Diagnosis Date    Chronic kidney disease     stage III    Diabetes ([facility] [address]     Gout     Irregular heart beats        PAST SURGICAL HISTORY  Past Surgical History:   Procedure Laterality Date    HX COLONOSCOPY      x3    HX VASECTOMY         SOCIAL HISTORY  Social History     Socioeconomic History    Marital status:      Spouse name: Not on file    Number of children: Not on file    Years of education: Not on file    Highest education level: Not on file   Occupational History    Not on file   Tobacco Use    Smoking status: Never    Smokeless tobacco: Never   Vaping Use    Vaping Use: Never used   Substance and Sexual Activity    Alcohol use: Yes     Alcohol/week: 1.0 standard drink     Types: 1 Shots of liquor per week    Drug use: Not Currently    Sexual activity: Not on file   Other Topics Concern    Not on file   Social History Narrative    Not on file     Social Determinants of Health     Financial Resource Strain: Not on file   Food Insecurity: Not on file   Transportation Needs: Not on file   Physical Activity: Not on file   Stress: Not on file   Social Connections: Not on file   Intimate Partner Violence: Not on file   Housing Stability: Not on file       FAMILY HISTORY  No family history on file. CURRENT MEDICATIONS  Current Outpatient Medications   Medication Sig Dispense Refill    dutasteride (AVODART) 0.5 mg capsule Take 0.5 mg by mouth daily. simvastatin (ZOCOR) 20 mg tablet Take 20 mg by mouth nightly. tamsulosin (FLOMAX) 0.4 mg capsule Take 0.4 mg by mouth daily.       METFORMIN HCL (METFORMIN PO) Take 1,000 mg by mouth two (2) times a day. glipiZIDE (GLUCOTROL) 10 mg tablet Take 10 mg by mouth two (2) times a day. multivitamin (ONE A DAY) tablet Take 1 Tab by mouth daily. SITagliptin (JANUVIA) 50 mg tablet Take 50 mg by mouth daily.        Current Facility-Administered Medications   Medication Dose Route Frequency Provider Last Rate Last Admin    fentaNYL citrate (PF) injection  mcg   mcg IntraVENous Rad Multiple Luc Knight MD        midazolam (VERSED) injection 5 mg  5 mg IntraVENous Multiple Luc Knight MD        0.9% sodium chloride infusion  25 mL/hr IntraVENous CONTINUOUS Luc Knight MD           ALLERGIES  No Known Allergies    DIAGNOSTIC STUDIES   IMAGING STUDIES  N/A    LABS  Lab Results   Component Value Date/Time    WBC 4.3 03/28/2023 08:13 AM    HGB 11.0 (L) 03/28/2023 08:13 AM    HCT 32.8 (L) 03/28/2023 08:13 AM    PLATELET 824 09/56/1084 08:13 AM    MCV 97.3 03/28/2023 08:13 AM     Lab Results   Component Value Date/Time    Sodium 134 (L) 12/20/2022 01:31 AM    Potassium 4.3 12/20/2022 01:31 AM    Chloride 102 12/20/2022 01:31 AM    CO2 28 12/20/2022 01:31 AM    Anion gap 4 (L) 12/20/2022 01:31 AM    Glucose 175 (H) 12/20/2022 01:31 AM    BUN 30 (H) 12/20/2022 01:31 AM    Creatinine 1.74 (H) 12/20/2022 01:31 AM    BUN/Creatinine ratio 17 12/20/2022 01:31 AM    GFR est AA >60 05/11/2015 02:59 PM    GFR est non-AA >60 05/11/2015 02:59 PM    Calcium 9.4 12/20/2022 01:31 AM     Lab Results   Component Value Date/Time    INR 1.0 05/11/2015 02:59 PM    Prothrombin time 10.5 05/11/2015 02:59 PM       PHYSICAL EXAM   BP (!) 141/72 (BP 1 Location: Left upper arm, BP Patient Position: Supine)   Pulse (!) 56   Temp 97.8 °F (36.6 °C)   Resp 18   Ht 6' 2\" (1.88 m)   Wt 88.5 kg (195 lb)   SpO2 100%   BMI 25.04 kg/m²   General:  NAD  Heart:  RRR  Lungs:  NWOB  Neurological:  AAOX3    PLAN   Procedure to be performed:  CT guided bone marrow biopsy  Plan for sedation:  moderate  Post procedure plan:  observation per protocol  Informed consent:  risks, benefits, and alternatives reviewed with the patient / family who agree to proceed  Code status:  No Order      Tristen Sharma, [facility].

## 2023-04-03 ENCOUNTER — TRANSCRIBE ORDER (OUTPATIENT)
Dept: SCHEDULING | Age: 78
End: 2023-04-03

## 2023-04-03 DIAGNOSIS — D47.2 MONOCLONAL PARAPROTEINEMIA: Primary | ICD-10-CM

## 2023-04-03 DIAGNOSIS — C85.10 B-CELL LYMPHOMA (HCC): ICD-10-CM

## 2023-04-04 NOTE — PROGRESS NOTES
HPI: Anila Carrillo (: 1945) is a 76 y.o. male, patient, here for evaluation of the following chief complaint(s):    No chief complaint on file. Patient is seen today to evaluate his hands. 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 He denies any fall or injury. He states he did build a gazebo around 2017 and noted numbness especially in his hands after awakening from sleep. He has tried to treat conservatively. 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 Lastly he has been complaining of catching triggering locking of his right ring finger. He elected undergo a right open carpal tunnel release with a right elbow ulnar nerve decompression and right ring and middle trigger finger release on 2022. Vitals: There were no vitals taken for this visit. There is no height or weight on file to calculate BMI. No Known Allergies    Current Outpatient Medications   Medication Sig    dutasteride (AVODART) 0.5 mg capsule Take 0.5 mg by mouth daily. simvastatin (ZOCOR) 20 mg tablet Take 20 mg by mouth nightly. tamsulosin (FLOMAX) 0.4 mg capsule Take 0.4 mg by mouth daily. METFORMIN HCL (METFORMIN PO) Take 1,000 mg by mouth two (2) times a day. glipiZIDE (GLUCOTROL) 10 mg tablet Take 10 mg by mouth two (2) times a day. multivitamin (ONE A DAY) tablet Take 1 Tab by mouth daily. SITagliptin (JANUVIA) 50 mg tablet Take 50 mg by mouth daily. No current facility-administered medications for this visit.        Past Medical History:   Diagnosis Date    Chronic kidney disease     stage III    Diabetes ([facility] [address]     Gout     Irregular heart beats         Past Surgical History: Procedure Laterality Date    HX COLONOSCOPY      x3    HX VASECTOMY         No family history on file. Social History     Tobacco Use    Smoking status: Never    Smokeless tobacco: Never   Vaping Use    Vaping Use: Never used   Substance Use Topics    Alcohol use: Yes     Alcohol/week: 1.0 standard drink     Types: 1 Shots of liquor per week    Drug use: Not Currently        Review of Systems   All other systems reviewed and are negative. Physical Exam    Postoperatively the wounds are fully healed and there is really no redness drainage or sign of infection. He has excellent range of motion as well as sensibility throughout his hand with good thenar and intrinsic strength. He has milder complaints in the left carpal tunnel region. Imaging:    No new x-rays today. ASSESSMENT/PLAN:  Below is the assessment and plan developed based on review of pertinent history, physical exam, labs, studies, and medications. Patient examination was consistent with bilateral right greater than left wrist carpal tunnel syndrome with right elbow ulnar nerve entrapment and right ring finger stenosing tenosynovitis with locking. He has been increasingly symptomatic for 7 to 8 years. I reviewed treatment options and answered his questions. He feels he has exhausted his conservative options and elects to pursue operative treatment. The current plan is to perform a right open carpal tunnel release right elbow ulnar nerve decompression of the right ring trigger finger. In the future he could then consider at least an left carpal tunnel release. He elected undergo a right open carpal tunnel release with a right elbow ulnar nerve decompression and right ring and middle trigger finger release on 12/14/2022. He returned for suture removal on 12/28/2022. Continue with wound care, motion and strength. Overall he is pleased with his progress.       His left side is mildly symptomatic and for now he wants to pursue a course of observation. He also reported that he had a bone marrow biopsy that showed small B-cell lymphoma and he is embarking on appropriate oncologic treatment. I wished him well and plan to see him back in 4 to 6 weeks or anytime for further treatment. 1. Bilateral carpal tunnel syndrome  2. Ulnar neuropathy at elbow of right upper extremity  3. Trigger ring finger of right hand  4. Status post carpal tunnel release  5. Status post decompression of ulnar nerve  6. Bilateral hand pain      No follow-ups on file. An electronic signature was used to authenticate this note.   -- Dora Osullivan MD

## 2023-04-05 ENCOUNTER — OFFICE VISIT (OUTPATIENT)
Dept: ORTHOPEDIC SURGERY | Age: 78
End: 2023-04-05

## 2023-04-05 NOTE — LETTER
4/5/2023    Patient: Junie Ya. YOB: 1945   Date of Visit: 4/5/2023     Justin Mckay MD  [address]  [address]  [address] [facility] [address]    Dear Justin Mckay MD,      Thank you for referring Mr. Gloria Qiu to [facility] for evaluation. My notes for this consultation are attached. If you have questions, please do not hesitate to call me. I look forward to following your patient along with you.       Sincerely,    Adolfo Mathews MD

## 2023-05-17 RX ORDER — TAMSULOSIN HYDROCHLORIDE 0.4 MG/1
0.4 CAPSULE ORAL DAILY
COMMUNITY
Start: 2022-09-16

## 2023-05-17 RX ORDER — GLIPIZIDE 10 MG/1
10 TABLET ORAL 2 TIMES DAILY
COMMUNITY

## 2023-05-17 RX ORDER — DUTASTERIDE 0.5 MG/1
0.5 CAPSULE, LIQUID FILLED ORAL DAILY
COMMUNITY
Start: 2022-09-16

## 2023-05-17 RX ORDER — SIMVASTATIN 20 MG
20 TABLET ORAL NIGHTLY
COMMUNITY
Start: 2022-09-16

## (undated) DEVICE — SUT MCRYL 4-0 27IN PS2 UD --

## (undated) DEVICE — SUTURE VCRL SZ 3-0 L27IN ABSRB UD L19MM PS-2 3/8 CIR PRIM J427H

## (undated) DEVICE — HAND-MRMCASU: Brand: MEDLINE INDUSTRIES, INC.

## (undated) DEVICE — ZIMMER® STERILE DISPOSABLE TOURNIQUET CUFF WITH PLC, DUAL PORT, SINGLE BLADDER, 18 IN. (46 CM)

## (undated) DEVICE — PAD,ABDOMINAL,5"X9",ST,LF,25/BX: Brand: MEDLINE INDUSTRIES, INC.

## (undated) DEVICE — SYR 10ML LUER LOK 1/5ML GRAD --

## (undated) DEVICE — GLOVE ORANGE PI 8   MSG9080

## (undated) DEVICE — STRIP,CLOSURE,WOUND,MEDI-STRIP,1/2X4: Brand: MEDLINE

## (undated) DEVICE — PADDING CAST 3 INX4 YD STRL

## (undated) DEVICE — MASTISOL ADHESIVE LIQ 2/3ML

## (undated) DEVICE — WRAP COHESIVE W2INXL5YD TAN SELF ADH BNDG HND NON STERILE TEAR CARING

## (undated) DEVICE — BANDAGE,GAUZE,BULKEE II,2.25"X3YD,STRL: Brand: MEDLINE

## (undated) DEVICE — BLADE OPHTH GRN ROUNDED TIP 1 SIDE SHRP GRINDLESS MINI-BLDE

## (undated) DEVICE — SUTURE NONABSORBABLE MONOFILAMENT 4-0 PS-2 18 IN BLK ETHILON 1667G

## (undated) DEVICE — BIPOLAR FORCEPS CORD: Brand: VALLEYLAB

## (undated) DEVICE — SOLUTION IRRIG 1000ML 0.9% SOD CHL USP POUR PLAS BTL